# Patient Record
Sex: MALE | Race: WHITE | NOT HISPANIC OR LATINO | Employment: UNEMPLOYED | URBAN - METROPOLITAN AREA
[De-identification: names, ages, dates, MRNs, and addresses within clinical notes are randomized per-mention and may not be internally consistent; named-entity substitution may affect disease eponyms.]

---

## 2017-09-28 ENCOUNTER — ALLSCRIPTS OFFICE VISIT (OUTPATIENT)
Dept: OTHER | Facility: OTHER | Age: 32
End: 2017-09-28

## 2017-09-28 DIAGNOSIS — D64.9 ANEMIA: ICD-10-CM

## 2017-09-28 DIAGNOSIS — M54.6 PAIN IN THORACIC SPINE: ICD-10-CM

## 2017-09-28 DIAGNOSIS — K21.9 GASTRO-ESOPHAGEAL REFLUX DISEASE WITHOUT ESOPHAGITIS: ICD-10-CM

## 2017-09-28 DIAGNOSIS — R06.83 SNORING: ICD-10-CM

## 2017-09-28 DIAGNOSIS — M54.50 LOW BACK PAIN: ICD-10-CM

## 2017-09-28 DIAGNOSIS — M54.2 CERVICALGIA: ICD-10-CM

## 2017-09-28 DIAGNOSIS — R40.0 SOMNOLENCE: ICD-10-CM

## 2017-11-10 ENCOUNTER — TRANSCRIBE ORDERS (OUTPATIENT)
Dept: ADMINISTRATIVE | Facility: HOSPITAL | Age: 32
End: 2017-11-10

## 2017-11-10 DIAGNOSIS — G47.30 SLEEP APNEA, UNSPECIFIED TYPE: Primary | ICD-10-CM

## 2017-12-11 ENCOUNTER — HOSPITAL ENCOUNTER (OUTPATIENT)
Dept: SLEEP CENTER | Facility: CLINIC | Age: 32
Discharge: HOME | End: 2017-12-11
Payer: COMMERCIAL

## 2017-12-11 DIAGNOSIS — G47.33 OBSTRUCTIVE SLEEP APNEA SYNDROME: ICD-10-CM

## 2017-12-11 DIAGNOSIS — G47.30 SLEEP APNEA, UNSPECIFIED TYPE: ICD-10-CM

## 2017-12-11 PROCEDURE — 95810 POLYSOM 6/> YRS 4/> PARAM: CPT

## 2017-12-27 ENCOUNTER — TRANSCRIBE ORDERS (OUTPATIENT)
Dept: SLEEP CENTER | Facility: CLINIC | Age: 32
End: 2017-12-27

## 2017-12-27 DIAGNOSIS — G47.33 OSA (OBSTRUCTIVE SLEEP APNEA): Primary | ICD-10-CM

## 2018-01-15 ENCOUNTER — HOSPITAL ENCOUNTER (OUTPATIENT)
Dept: SLEEP CENTER | Facility: CLINIC | Age: 33
Discharge: HOME | End: 2018-01-15
Payer: COMMERCIAL

## 2018-01-15 VITALS
RESPIRATION RATE: 16 BRPM | TEMPERATURE: 97.8 F | SYSTOLIC BLOOD PRESSURE: 108 MMHG | DIASTOLIC BLOOD PRESSURE: 78 MMHG | HEIGHT: 67 IN | BODY MASS INDEX: 37.2 KG/M2 | HEART RATE: 68 BPM | WEIGHT: 237.05 LBS

## 2018-01-15 DIAGNOSIS — G47.33 OSA (OBSTRUCTIVE SLEEP APNEA): ICD-10-CM

## 2018-01-15 PROCEDURE — 95811 POLYSOM 6/>YRS CPAP 4/> PARM: CPT

## 2018-01-22 ENCOUNTER — APPOINTMENT (OUTPATIENT)
Dept: LAB | Facility: MEDICAL CENTER | Age: 33
End: 2018-01-22
Payer: COMMERCIAL

## 2018-01-22 ENCOUNTER — HOSPITAL ENCOUNTER (OUTPATIENT)
Dept: SLEEP CENTER | Facility: CLINIC | Age: 33
Discharge: HOME | End: 2018-01-22
Payer: COMMERCIAL

## 2018-01-22 ENCOUNTER — TRANSCRIBE ORDERS (OUTPATIENT)
Dept: ADMINISTRATIVE | Facility: HOSPITAL | Age: 33
End: 2018-01-22

## 2018-01-22 DIAGNOSIS — G47.61 PERIODIC LIMB MOVEMENT DISORDER: ICD-10-CM

## 2018-01-22 DIAGNOSIS — G47.61 PERIODIC LIMB MOVEMENT DISORDER: Primary | ICD-10-CM

## 2018-01-22 DIAGNOSIS — G47.33 OSA (OBSTRUCTIVE SLEEP APNEA): ICD-10-CM

## 2018-01-22 DIAGNOSIS — G47.30 SLEEP APNEA, UNSPECIFIED TYPE: ICD-10-CM

## 2018-01-22 LAB
BASOPHILS # BLD AUTO: 0.05 THOUSANDS/ΜL (ref 0–0.1)
BASOPHILS NFR BLD AUTO: 1 % (ref 0–1)
EOSINOPHIL # BLD AUTO: 0.29 THOUSAND/ΜL (ref 0–0.61)
EOSINOPHIL NFR BLD AUTO: 4 % (ref 0–6)
ERYTHROCYTE [DISTWIDTH] IN BLOOD BY AUTOMATED COUNT: 13.3 % (ref 11.6–15.1)
FERRITIN SERPL-MCNC: 46 NG/ML (ref 8–388)
FOLATE SERPL-MCNC: 16.2 NG/ML (ref 3.1–17.5)
HCT VFR BLD AUTO: 43 % (ref 36.5–49.3)
HGB BLD-MCNC: 14.5 G/DL (ref 12–17)
IRON SATN MFR SERPL: 13 %
IRON SERPL-MCNC: 44 UG/DL (ref 65–175)
IRON SERPL-MCNC: 45 UG/DL (ref 65–175)
LYMPHOCYTES # BLD AUTO: 2.81 THOUSANDS/ΜL (ref 0.6–4.47)
LYMPHOCYTES NFR BLD AUTO: 34 % (ref 14–44)
MAGNESIUM SERPL-MCNC: 2.4 MG/DL (ref 1.6–2.6)
MCH RBC QN AUTO: 28.9 PG (ref 26.8–34.3)
MCHC RBC AUTO-ENTMCNC: 33.7 G/DL (ref 31.4–37.4)
MCV RBC AUTO: 86 FL (ref 82–98)
MONOCYTES # BLD AUTO: 0.56 THOUSAND/ΜL (ref 0.17–1.22)
MONOCYTES NFR BLD AUTO: 7 % (ref 4–12)
NEUTROPHILS # BLD AUTO: 4.66 THOUSANDS/ΜL (ref 1.85–7.62)
NEUTS SEG NFR BLD AUTO: 54 % (ref 43–75)
NRBC BLD AUTO-RTO: 0 /100 WBCS
PLATELET # BLD AUTO: 289 THOUSANDS/UL (ref 149–390)
PMV BLD AUTO: 10.5 FL (ref 8.9–12.7)
RBC # BLD AUTO: 5.01 MILLION/UL (ref 3.88–5.62)
TIBC SERPL-MCNC: 342 UG/DL (ref 250–450)
TIBC SERPL-MCNC: 348 UG/DL (ref 250–450)
VIT B12 SERPL-MCNC: 734 PG/ML (ref 100–900)
WBC # BLD AUTO: 8.38 THOUSAND/UL (ref 4.31–10.16)

## 2018-01-22 PROCEDURE — 83540 ASSAY OF IRON: CPT

## 2018-01-22 PROCEDURE — 83550 IRON BINDING TEST: CPT

## 2018-01-22 PROCEDURE — 83735 ASSAY OF MAGNESIUM: CPT

## 2018-01-22 PROCEDURE — 82607 VITAMIN B-12: CPT

## 2018-01-22 PROCEDURE — 82728 ASSAY OF FERRITIN: CPT

## 2018-01-22 PROCEDURE — 82746 ASSAY OF FOLIC ACID SERUM: CPT

## 2018-01-22 PROCEDURE — 85025 COMPLETE CBC W/AUTO DIFF WBC: CPT

## 2018-01-22 PROCEDURE — 36415 COLL VENOUS BLD VENIPUNCTURE: CPT

## 2018-01-22 NOTE — CONSULTS
Sleep Consultation   Alejandro Melo  33 y o  male MRN: [id number]      Reason for consultation: Snoring, excessive daytime sleepiness    Requesting physician: Kadie Vergara    Assessment/Plan  34 y/o M with PMHx hiatal hernia s/p repair, GERD, chronic lumbar and thoracic spine pain who comes in for management of COLLEEN, periodic limb movement and chronic lumbar pain  1   Severe COLLEEN (AHI - 86 4) - to start AutoPAP 11-20 using a large [employer] Eson ([facility])      -  Start trial of autoPAP 11 - 20 today, follow compliance data in 1- 2 months      -  Discussed in depth the results of the sleep study and treatment pitfalls prior to initiation  Answered all questions regarding treatment    2  Restless legs/Periodic limb movement (PLM index - 36 1,  PLM index w/ arousal - 0 3) -  This may be secondary to chronic iron deficiency anemia, chronic low back pain, and COLLEEN       -  Treat COLLEEN as above       -  Check CBC, Iron studies to ensure that is not a cause for PLMs       -  Refer to hematology as he was previously on iron treatments       -  May benefit from Neurontin to help with chronic pain as well  Just received lidocaine injections as outpatient  3   Chronic pain -  May be contributing to sleepiness whether related to PLMS or restless sleep due to discomfort  May need to optimize pain management  History of Present Illness   HPI:  Alejandro Melo  is a 33 y o  male with PMHx as below who comes in for evaluation of snoring and excessive daytime sleepiness  Prior to come be evaluated today he underwent a diagnostic sleep study and CPAP titration  He was found to have severe COLLEEN and requires CPAP of 13 to help  Patient notes symptoms of difficulty falling asleep, difficulty staying asleep, snoring, excessive daytime sleepiness with an [place] score of 15, awakenings with gasping, witnessed apneas, morning headaches, awakenings with dry mouth    He is very tired through the day is often dosing off unintentionally  He denies symptoms of cataplexy, sleep paralysis, hypnopompic or hypnagogic hallucinations     He also admits to symptoms of restless legs  He was previously on IV iron therapy for severe iron deficiency anemia  Sleep History:  He goes to bed at approximately 10 - 11 pm, will get to sleep in 2 min, will get out of bed at 3-4 am   He will get up 3-4 times at night to go to the bathroom or due to back pain  He does nap during he day  The naps are 1 hr in duration and are not refreshing  He drinks a lot of caffeine with 4-5 bottles of soda and 3-4 energy drinks to keep him awake  Review of systems:  12 point review of systems was completed and was otherwise negative except as listed in HPI  Historical Information   Past Medical History:   Diagnosis Date    GERD (gastroesophageal reflux disease)     Hiatal hernia      Past Surgical History:   Procedure Laterality Date    APPENDECTOMY      NISSEN FUNDOPLICATION N/A 4/21/7394    Procedure: AND NISSEN FUNDOPLICATION ;  Surgeon: Silvio Monk MD;  Location: [facility];  Service:     SD LAP, REPAIR PARAESOPHAGEAL HERNIA, INCL FUNDOPLASTY W/ MESH N/A 2/25/2016    Procedure: LAPAROSCOPIC REPAIR OF HIATAL HERNIA; Joelle Goodell;  Surgeon: Silvio Monk MD;  Location: [facility];  Service: Thoracic     No family history on file  Social History     Social History    Marital status: /Civil Union     Spouse name: N/A    Number of children: N/A    Years of education: N/A     Occupational History    Not on file       Social History Main Topics    Smoking status: Never Smoker    Smokeless tobacco: Not on file      Comment: E cigarette (water vapor)    Alcohol use No    Drug use: Unknown    Sexual activity: Not on file     Other Topics Concern    Not on file     Social History Narrative    No narrative on file       Meds/Allergies   Allergies   Allergen Reactions    Bactrim [Sulfamethoxazole-Trimethoprim] Other (See Comments)     High fever, shakes    Benadryl [Diphenhydramine Hcl (Sleep)] Hives       Home medications:  Prior to Admission medications    Medication Sig Start Date End Date Taking? Authorizing Provider   calcium carbonate (TUMS) 500 mg chewable tablet Chew 1 tablet daily as needed for indigestion or heartburn  Historical Provider, MD   pantoprazole (PROTONIX) 40 mg tablet Take 40 mg by mouth 2 (two) times a day  Historical Provider, MD     No family history on file  Vitals:   Weight - 245 Heigh - 5'6 BMI - 39 BP - 116/70,  HR - 70   Neck - 47 cm  ESS - 15    Physical Exam  General:  Awake alert and oriented x 3, conversant without conversational dyspnea, NAD, normal affect, obese  HEENT:  PERRL, Sclera noninjected, nonicteric OU, Nares patent,  no craniofacial abnormalities, Mucous membranes, moist, no oral lesions, normal dentition, Mallampati class 4  NECK: Trachea midline, no accessory muscle use, no stridor, no cervical or supraclavicular adenopathy, JVP not elevated  CARDIAC: Reg, single s1/S2, no m/r/g  PULM: CTA bilaterally no wheezing, rhonchi or rales  ABD: Normoactive bowel sounds, soft nontender, nondistended, no rebound, no rigidity, no guarding  EXT: No cyanosis, no clubbing, no edema, normal capillary refill  NEURO: no focal neurologic deficits, AAOx3, moving all extremities appropriately    Labs: I have personally reviewed pertinent lab results      Sleep studies:  Diagnostic: 12/11/17 AHI - 88 9  Titration: CPAP titrated successfully to 13 cc of H2O                                       DO [facility]

## 2018-03-28 ENCOUNTER — OFFICE VISIT (OUTPATIENT)
Dept: SLEEP CENTER | Facility: CLINIC | Age: 33
End: 2018-03-28
Payer: COMMERCIAL

## 2018-03-28 VITALS
DIASTOLIC BLOOD PRESSURE: 80 MMHG | BODY MASS INDEX: 39.34 KG/M2 | SYSTOLIC BLOOD PRESSURE: 110 MMHG | WEIGHT: 244.8 LBS | HEART RATE: 80 BPM | HEIGHT: 66 IN

## 2018-03-28 DIAGNOSIS — G47.61 PERIODIC LIMB MOVEMENT: ICD-10-CM

## 2018-03-28 DIAGNOSIS — F51.12 INSUFFICIENT SLEEP SYNDROME: ICD-10-CM

## 2018-03-28 DIAGNOSIS — G47.19 EXCESSIVE DAYTIME SLEEPINESS: Primary | ICD-10-CM

## 2018-03-28 DIAGNOSIS — G47.33 OSA (OBSTRUCTIVE SLEEP APNEA): ICD-10-CM

## 2018-03-28 PROCEDURE — 99215 OFFICE O/P EST HI 40 MIN: CPT | Performed by: INTERNAL MEDICINE

## 2018-03-28 RX ORDER — IBUPROFEN 400 MG/1
1 TABLET ORAL EVERY 8 HOURS PRN
COMMUNITY

## 2018-03-28 NOTE — PROGRESS NOTES
Review of Systems      Genitourinary frequent urination at night   Cardiology none   Gastrointestinal none   Neurology headaches   Constitutional weight change of 10 or more pounds in the past year gain of 40 pounds   Integumentary none   Psychiatry depression   Musculoskeletal muscle tenderness/aching and back pain   Pulmonary none   ENT nasal or sinus congestion and decreased hearing   Endocrine none   Hematological blood donor

## 2018-03-28 NOTE — LETTER
March 28, 2018     Grecia Rosario [address] [facility]  [address]  [address] [id number]    Patient: Blanco Angeles YOB: 1985   Date of Visit: 3/28/2018       Dear Dr Maria Sherman:    Thank you for referring Ariel Guerra to me for evaluation  Below are my notes for this consultation  If you have questions, please do not hesitate to call me  I look forward to following your patient along with you  Sincerely,        Charity Hill DO        CC: MD Charity Chavez DO  3/28/2018 10:37 AM  Sign at close encounter  Progress Note - Sleep Medicine  Byron Fernandez Ortley  33 y o  male MRN: [id number]       Impression & Plan:   32 y/o M with PMHx hiatal hernia s/p repair, GERD, chronic lumbar and thoracic spine pain who comes in for follow up of COLLEEN  1    Excessive daytime sleepiness - multifactorial   Secondary to inadequate use of CPAP, insufficient sleep and periodic limb movements and chronic pain  -  Management of each as below      2  Severe COLLEEN (AHI - 86 4) - on CPAP 13 using a large [employer] Eson ([facility])  Residual AHI 3 2  However compliance is still not ideal   He only uses it less than 4 hours a day and about 80% of the time      -  I explained that he needs to use his machine for at least 4 hours but ideally up to 8 hours a day every night       -  He states there are times he sleeps without the mask  He also mentioned times where the mask will come off  I discussed strategies to get mask on before bed and then to keep it on including making sure it is adequately fastened  3   Insufficient sleep -  He only gets at best 4- 6 hours of sleep due to the fact that his children will keep him up late at night and then he will get up early for work  I explained that he needs to increase his sleep time by 30 minute each day  I also explained the importance of good sleep hygiene as well      4   Restless legs/Periodic limb movement (PLM index - 36 1,  PLM index w/ arousal - 0 3) -  This may be secondary to chronic iron deficiency anemia, chronic low back pain, and COLLEEN       -    Optimize COLLEEN treatment        -  He has low ferritin  I have asked that he start Iron supplementation with 325mg BID       -    He has an appointment with hematology pending, was previously on IV iron        -    If iron supplementation does not help, will add Neurontin to help with chronic pain as well       5  Chronic pain -  May be contributing to sleepiness whether related to PLMS or restless sleep due to discomfort  May need to optimize pain management        ______________________________________________________________________    HPI:    Farhat Delgadokristi  presents today for follow-up of for COLLEEN  He states that he feels better with the CPAP  However, even despite using the CPAP he still feels that there are days his sleep quality is not significantly better  He has noted a big difference overall in sleep quality but still feels that he is more tired than not  He admits that there are times he forgets to wear his mask or it will come off at night  He does not have trouble with pressure or mask type however  He also states that he has reduced sleep opportunity and is unable to get more sleep beyond on weekends  He does not note restless leg symptoms but does note chronic pain is still there  We also discussed lower iron levels and he still has iron tablets at home that he is willing to start taking again  Review of Systems:  Review of Systems   Constitutional: Positive for fatigue  Negative for appetite change and unexpected weight change  HENT: Negative for congestion, facial swelling and sinus pressure  Eyes: Negative for pain, discharge and visual disturbance  Respiratory: Negative for chest tightness, shortness of breath and wheezing  Cardiovascular: Negative      Gastrointestinal: Negative for abdominal distention, nausea and vomiting  Endocrine: Negative  Genitourinary: Negative  Musculoskeletal: Negative  Skin: Negative  Allergic/Immunologic: Negative  Neurological: Negative  Hematological: Negative  Social history updates:  History   Smoking Status    Never Smoker   Smokeless Tobacco    Never Used     Comment: E cigarette (water vapor)     Social History     Social History    Marital status: /Civil Union     Spouse name: N/A    Number of children: N/A    Years of education: N/A     Occupational History    Not on file  Social History Main Topics    Smoking status: Never Smoker    Smokeless tobacco: Never Used      Comment: E cigarette (water vapor)    Alcohol use No    Drug use: No    Sexual activity: Not on file     Other Topics Concern    Not on file     Social History Narrative    Caffeine use           PhysicalExamination:  Vitals:   /80   Pulse 80   Ht 5' 6" (1 676 m)   Wt 111 kg (244 lb 12 8 oz)   BMI 39 51 kg/m²    Physical Exam  General: Obese, Awake alert and oriented x 3, conversant without conversational dyspnea, NAD, normal affect  HEENT:  PERRL, Sclera noninjected, nonicteric OU, Nares patent,  no craniofacial abnormalities, Mucous membranes, moist, no oral lesions, normal dentition, Mallampati class 4  NECK: Trachea midline, no accessory muscle use, no stridor, no cervical or supraclavicular adenopathy, JVP not elevated  CARDIAC: Reg, single s1/S2, no m/r/g  PULM: CTA bilaterally no wheezing, rhonchi or rales  ABD: Normoactive bowel sounds, soft nontender, nondistended, no rebound, no rigidity, no guarding  EXT: No cyanosis, no clubbing, no edema, normal capillary refill  NEURO: no focal neurologic deficits, AAOx3, moving all extremities appropriately    Diagnostic Data:  Labs:   I personally reviewed the most recent laboratory data pertinent to today's visit    Lab Results   Component Value Date    WBC 8 38 01/22/2018    HGB 14 5 01/22/2018    HCT 43 0 01/22/2018 MCV 86 01/22/2018     01/22/2018     Lab Results   Component Value Date    GLUCOSE 107 02/25/2016    CALCIUM 8 2 (L) 02/25/2016     02/25/2016    K 4 5 02/25/2016    CO2 23 02/25/2016     02/25/2016    BUN 18 02/25/2016    CREATININE 1 12 02/25/2016     No results found for: IGE  Lab Results   Component Value Date    ALT 45 07/31/2015    AST 16 07/31/2015    ALKPHOS 106 07/31/2015    BILITOT 0 18 (L) 07/31/2015     Lab Results   Component Value Date    IRON 45 (L) 01/22/2018    TIBC 342 01/22/2018    FERRITIN 46 01/22/2018     Lab Results   Component Value Date    [id number] 734 01/22/2018     Lab Results   Component Value Date    FOLATE 16 2 01/22/2018       Sleep studies:  Diagnostic: 12/11/17 AHI - 88 9  Titration: CPAP titrated successfully to 13 cc of H2O                                  Compliance Data:  Type of CPAP:  13                                   Percent usage: 80%                                   Average time used: 3 hrs 56 mins                                  Time in large leak: 2 secs                                   Residual AHI: 3 2                                         Claudeen Berry, DO

## 2018-03-28 NOTE — PROGRESS NOTES
Progress Note - Sleep Medicine  Katheryn Urias  33 y o  male MRN: [id number]       Impression & Plan:   34 y/o M with PMHx hiatal hernia s/p repair, GERD, chronic lumbar and thoracic spine pain who comes in for follow up of COLLEEN  1   Excessive daytime sleepiness - [place] 13 - multifactorial   Secondary to inadequate use of CPAP, insufficient sleep and periodic limb movements and chronic pain  -  Management of each as below        -  He did have early REM onset on his CPAP titration but I feel that was more likely due to resolution of sleep deprivation  He denies symptoms of cataplexy, sleep paralysis or hallucinations so I feel narcolepsy is highly unlikely and he just needs to optimize current treatments  2   Severe COLLEEN (AHI - 86 4) - on CPAP 13 using a large [employer] Eson ([facility])  Residual AHI 3 2  However compliance is still not ideal   He only uses it less than 4 hours a day and about 80% of the time      -  I explained that he needs to use his machine for at least 4 hours but ideally up to 8 hours a day every night       -  He states there are times he sleeps without the mask  He also mentioned times where the mask will come off  I discussed strategies to get mask on before bed and then to keep it on including making sure it is adequately fastened  3   Insufficient sleep -  He only gets at best 4- 6 hours of sleep due to the fact that his children will keep him up late at night and then he will get up early for work  I explained that he needs to increase his sleep time by 30 minute each day  I also explained the importance of good sleep hygiene as well  4   Restless legs/Periodic limb movement (PLM index - 36 1,  PLM index w/ arousal - 0 3) -  This may be secondary to chronic iron deficiency anemia, chronic low back pain, and COLLEEN       -  Optimize COLLEEN treatment        -  He has low ferritin    I have asked that he start Iron supplementation with 325mg BID -  He has an appointment with hematology pending, was previously on IV iron        -  If iron supplementation does not help, will add Neurontin to help with chronic pain as well       5  Chronic pain -  May be contributing to sleepiness whether related to PLMS or restless sleep due to discomfort  May need to optimize pain management        ______________________________________________________________________    HPI:    Andi Reis  presents today for follow-up of for COLLEEN  He states that he feels better with the CPAP  However, even despite using the CPAP he still feels that there are days his sleep quality is not significantly better  He has noted a big difference overall in sleep quality but still feels that he is more tired than not  He admits that there are times he forgets to wear his mask or it will come off at night  He does not have trouble with pressure or mask type however  He also states that he has reduced sleep opportunity and is unable to get more sleep beyond on weekends  He does not note restless leg symptoms but does note chronic pain is still there  We also discussed lower iron levels and he still has iron tablets at home that he is willing to start taking again  Review of Systems:  Review of Systems   Constitutional: Positive for fatigue  Negative for appetite change and unexpected weight change  HENT: Negative for congestion, facial swelling and sinus pressure  Eyes: Negative for pain, discharge and visual disturbance  Respiratory: Negative for chest tightness, shortness of breath and wheezing  Cardiovascular: Negative  Gastrointestinal: Negative for abdominal distention, nausea and vomiting  Endocrine: Negative  Genitourinary: Negative  Musculoskeletal: Negative  Skin: Negative  Allergic/Immunologic: Negative  Neurological: Negative  Hematological: Negative          Social history updates:  History   Smoking Status    Never Smoker Smokeless Tobacco    Never Used     Comment: E cigarette (water vapor)     Social History     Social History    Marital status: /Civil Union     Spouse name: N/A    Number of children: N/A    Years of education: N/A     Occupational History    Not on file  Social History Main Topics    Smoking status: Never Smoker    Smokeless tobacco: Never Used      Comment: E cigarette (water vapor)    Alcohol use No    Drug use: No    Sexual activity: Not on file     Other Topics Concern    Not on file     Social History Narrative    Caffeine use           PhysicalExamination:  Vitals:   /80   Pulse 80   Ht 5' 6" (1 676 m)   Wt 111 kg (244 lb 12 8 oz)   BMI 39 51 kg/m²   Physical Exam  General: Obese, Awake alert and oriented x 3, conversant without conversational dyspnea, NAD, normal affect  HEENT:  PERRL, Sclera noninjected, nonicteric OU, Nares patent,  no craniofacial abnormalities, Mucous membranes, moist, no oral lesions, normal dentition, Mallampati class 4  NECK: Trachea midline, no accessory muscle use, no stridor, no cervical or supraclavicular adenopathy, JVP not elevated  CARDIAC: Reg, single s1/S2, no m/r/g  PULM: CTA bilaterally no wheezing, rhonchi or rales  ABD: Normoactive bowel sounds, soft nontender, nondistended, no rebound, no rigidity, no guarding  EXT: No cyanosis, no clubbing, no edema, normal capillary refill  NEURO: no focal neurologic deficits, AAOx3, moving all extremities appropriately    Diagnostic Data:  Labs:   I personally reviewed the most recent laboratory data pertinent to today's visit    Lab Results   Component Value Date    WBC 8 38 01/22/2018    HGB 14 5 01/22/2018    HCT 43 0 01/22/2018    MCV 86 01/22/2018     01/22/2018     Lab Results   Component Value Date    GLUCOSE 107 02/25/2016    CALCIUM 8 2 (L) 02/25/2016     02/25/2016    K 4 5 02/25/2016    CO2 23 02/25/2016     02/25/2016    BUN 18 02/25/2016    CREATININE 1 12 02/25/2016 No results found for: IGE  Lab Results   Component Value Date    ALT 45 07/31/2015    AST 16 07/31/2015    ALKPHOS 106 07/31/2015    BILITOT 0 18 (L) 07/31/2015     Lab Results   Component Value Date    IRON 45 (L) 01/22/2018    TIBC 342 01/22/2018    FERRITIN 46 01/22/2018     Lab Results   Component Value Date    [id number] 734 01/22/2018     Lab Results   Component Value Date    FOLATE 16 2 01/22/2018       Sleep studies:  Diagnostic: 12/11/17 AHI - 88 9  Titration: CPAP titrated successfully to 13 cc of H2O                                  Compliance Data:  Type of CPAP:  13                                   Percent usage: 80%                                   Average time used: 3 hrs 56 mins                                  Time in large leak: 2 secs                                   Residual AHI: 3 2                                         Andrzej Arizmendi DO

## 2018-03-28 NOTE — LETTER
March 28, 2018     Richard Jones, [place] [address] [facility]  [address]  [address] [address]    Patient: Dasia Cabral YOB: 1985   Date of Visit: 3/28/2018       Dear Dr Deanna Parikh:    Thank you for referring Cesario Chavis to me for evaluation  Below are my notes for this consultation  If you have questions, please do not hesitate to call me  I look forward to following your patient along with you  Sincerely,        Jayashree Jhaveri DO        CC: No Recipients  Jayashree Jhaveri DO  3/28/2018 10:40 AM  Sign at close encounter  Progress Note - Sleep Medicine  Byron Day  33 y o  male MRN: [id number]       Impression & Plan:   34 y/o M with PMHx hiatal hernia s/p repair, GERD, chronic lumbar and thoracic spine pain who comes in for follow up of COLLEEN  1    Excessive daytime sleepiness - multifactorial   Secondary to inadequate use of CPAP, insufficient sleep and periodic limb movements and chronic pain  -  Management of each as below        -  He did have early REM onset on his CPAP titration but I feel that was more likely due to resolution of sleep deprivation  He denies symptoms of cataplexy, sleep paralysis or hallucinations so I feel narcolepsy is highly unlikely and he just needs to optimize current treatments  2   Severe COLLEEN (AHI - 86 4) - on CPAP 13 using a large [employer] (DME - [facility])  Residual AHI 3 2  However compliance is still not ideal   He only uses it less than 4 hours a day and about 80% of the time      -  I explained that he needs to use his machine for at least 4 hours but ideally up to 8 hours a day every night       -  He states there are times he sleeps without the mask  He also mentioned times where the mask will come off  I discussed strategies to get mask on before bed and then to keep it on including making sure it is adequately fastened      3   Insufficient sleep -  He only gets at best 4- 6 hours of sleep due to the fact that his children will keep him up late at night and then he will get up early for work  I explained that he needs to increase his sleep time by 30 minute each day  I also explained the importance of good sleep hygiene as well  4   Restless legs/Periodic limb movement (PLM index - 36 1,  PLM index w/ arousal - 0 3) -  This may be secondary to chronic iron deficiency anemia, chronic low back pain, and COLLEEN       -    Optimize COLLEEN treatment        -  He has low ferritin  I have asked that he start Iron supplementation with 325mg BID       -    He has an appointment with hematology pending, was previously on IV iron        -    If iron supplementation does not help, will add Neurontin to help with chronic pain as well       5  Chronic pain -  May be contributing to sleepiness whether related to PLMS or restless sleep due to discomfort  May need to optimize pain management        ______________________________________________________________________    HPI:    Ezio Maryam  presents today for follow-up of for COLLEEN  He states that he feels better with the CPAP  However, even despite using the CPAP he still feels that there are days his sleep quality is not significantly better  He has noted a big difference overall in sleep quality but still feels that he is more tired than not  He admits that there are times he forgets to wear his mask or it will come off at night  He does not have trouble with pressure or mask type however  He also states that he has reduced sleep opportunity and is unable to get more sleep beyond on weekends  He does not note restless leg symptoms but does note chronic pain is still there  We also discussed lower iron levels and he still has iron tablets at home that he is willing to start taking again  Review of Systems:  Review of Systems   Constitutional: Positive for fatigue  Negative for appetite change and unexpected weight change     HENT: Negative for congestion, facial swelling and sinus pressure  Eyes: Negative for pain, discharge and visual disturbance  Respiratory: Negative for chest tightness, shortness of breath and wheezing  Cardiovascular: Negative  Gastrointestinal: Negative for abdominal distention, nausea and vomiting  Endocrine: Negative  Genitourinary: Negative  Musculoskeletal: Negative  Skin: Negative  Allergic/Immunologic: Negative  Neurological: Negative  Hematological: Negative  Social history updates:  History   Smoking Status    Never Smoker   Smokeless Tobacco    Never Used     Comment: E cigarette (water vapor)     Social History     Social History    Marital status: /Civil Union     Spouse name: N/A    Number of children: N/A    Years of education: N/A     Occupational History    Not on file       Social History Main Topics    Smoking status: Never Smoker    Smokeless tobacco: Never Used      Comment: E cigarette (water vapor)    Alcohol use No    Drug use: No    Sexual activity: Not on file     Other Topics Concern    Not on file     Social History Narrative    Caffeine use           PhysicalExamination:  Vitals:   /80   Pulse 80   Ht 5' 6" (1 676 m)   Wt 111 kg (244 lb 12 8 oz)   BMI 39 51 kg/m²    Physical Exam  General: Obese, Awake alert and oriented x 3, conversant without conversational dyspnea, NAD, normal affect  HEENT:  PERRL, Sclera noninjected, nonicteric OU, Nares patent,  no craniofacial abnormalities, Mucous membranes, moist, no oral lesions, normal dentition, Mallampati class 4  NECK: Trachea midline, no accessory muscle use, no stridor, no cervical or supraclavicular adenopathy, JVP not elevated  CARDIAC: Reg, single s1/S2, no m/r/g  PULM: CTA bilaterally no wheezing, rhonchi or rales  ABD: Normoactive bowel sounds, soft nontender, nondistended, no rebound, no rigidity, no guarding  EXT: No cyanosis, no clubbing, no edema, normal capillary refill  NEURO: no focal neurologic deficits, AAOx3, moving all extremities appropriately    Diagnostic Data:  Labs:   I personally reviewed the most recent laboratory data pertinent to today's visit    Lab Results   Component Value Date    WBC 8 38 01/22/2018    HGB 14 5 01/22/2018    HCT 43 0 01/22/2018    MCV 86 01/22/2018     01/22/2018     Lab Results   Component Value Date    GLUCOSE 107 02/25/2016    CALCIUM 8 2 (L) 02/25/2016     02/25/2016    K 4 5 02/25/2016    CO2 23 02/25/2016     02/25/2016    BUN 18 02/25/2016    CREATININE 1 12 02/25/2016     No results found for: IGE  Lab Results   Component Value Date    ALT 45 07/31/2015    AST 16 07/31/2015    ALKPHOS 106 07/31/2015    BILITOT 0 18 (L) 07/31/2015     Lab Results   Component Value Date    IRON 45 (L) 01/22/2018    TIBC 342 01/22/2018    FERRITIN 46 01/22/2018     Lab Results   Component Value Date    CVYHTVLV13 734 01/22/2018     Lab Results   Component Value Date    FOLATE 16 2 01/22/2018       Sleep studies:  Diagnostic: 12/11/17 AHI - 88 9  Titration: CPAP titrated successfully to 13 cc of H2O                                  Compliance Data:  Type of CPAP:  13                                   Percent usage: 80%                                   Average time used: 3 hrs 56 mins                                  Time in large leak: 2 secs                                   Residual AHI: 3 2                                         Emerson Guillermo DO

## 2018-03-28 NOTE — PATIENT INSTRUCTIONS
Restless Legs Syndrome   WHAT YOU NEED TO KNOW:   Restless legs syndrome (RLS) is a condition that causes a powerful urge to move your legs and feet  You may also have tingling, creeping, itching, or throbbing sensations in your legs  You may have discomfort or pain  Movement relieves the symptoms for a short time  RLS is usually worse late in the day and at night  Your symptoms may come and go for days or weeks at a time, and worsen during periods of stress  DISCHARGE INSTRUCTIONS:   Contact your healthcare provider if:   · You cannot sleep because of your symptoms  · Your symptoms are getting worse  · You have questions or concerns about your condition or care  Medicines:   · Medicines  may help decrease your RLS symptoms  · Take your medicine as directed  Contact your healthcare provider if you think your medicine is not helping or if you have side effects  Tell him of her if you are allergic to any medicine  Keep a list of the medicines, vitamins, and herbs you take  Include the amounts, and when and why you take them  Bring the list or the pill bottles to follow-up visits  Carry your medicine list with you in case of an emergency  Manage your symptoms:   · Keep your legs warm  Wear thick socks or use an electric blanket  It may also help to take a hot bath or massage your legs before bedtime  · Exercise regularly  Moderate physical activity such as walking and stretching may help relieve your symptoms  Ask your healthcare provider about the best exercise plan for you  · Get enough sleep  You may need to go to bed earlier to get the sleep you need  Go to bed and wake up at the same time every day  · Do not drink caffeine or alcohol in the evening  Do not smoke or use tobacco products in the evening  Caffeine, alcohol, and tobacco can prevent you from sleeping well    Follow up with your healthcare provider as directed:  Write down your questions so you remember to ask them during your visits  © 2017 [employer]  Information is for End User's use only and may not be sold, redistributed or otherwise used for commercial purposes  All illustrations and images included in CareNotes® are the copyrighted property of A D [employer] M , [employer]  or [employer]  The above information is an  only  It is not intended as medical advice for individual conditions or treatments  Talk to your doctor, nurse or pharmacist before following any medical regimen to see if it is safe and effective for you  Sleep Apnea   AMBULATORY CARE:   Sleep apnea  is also called obstructive sleep apnea  It is a condition that causes you to stop breathing for 10 seconds or more while you are sleeping  During normal sleep, your throat is kept open by muscles that let air pass through easily  Sleep apnea causes the muscles and tissues around your throat to relax and block air from passing through  Sleep apnea may happen many times while you are asleep  Common symptoms include the following:   · No signs of breathing for 10 seconds or more while you sleep    · Snoring loudly, snorting, gasping or choking while you sleep, and waking up suddenly because of these    · A hard time thinking, remembering things, or focusing on your tasks the following day    · Headache or nausea    · Bedwetting or waking up often during the night to urinate    · Feeling sleepy, slow, and tired during the day  Seek care immediately if:   · You have chest pain or trouble breathing  Contact your healthcare provider if:   · You feel tired or depressed  · You have trouble staying awake during the day  · You have trouble thinking clearly  · You have questions or concerns about your condition or care  Treatment for sleep apnea  includes using a continuous positive airway pressure (CPAP) machine to keep your airway open during sleep  A mask is placed over your nose and mouth, or just your nose   The mask is hooked to the CPAP machine that blows a gentle stream of air into the mask when you breathe  This helps keep your airway open so you can breathe more regularly  Extra oxygen may be given to you through the machine  You may be given a mouth device  It looks like a mouth guard or dental retainer and stops your tongue and mouth tissues from blocking your throat while you sleep  Surgery may be needed to remove extra tissues that block your mouth, throat, or nose  Manage sleep apnea:   · Do not smoke  Nicotine and other chemicals in cigarettes and cigars can cause lung damage  Ask your healthcare provider for information if you currently smoke and need help to quit  E-cigarettes or smokeless tobacco still contain nicotine  Talk to your healthcare provider before you use these products  · Do not drink alcohol or take sedative medicine before you go to sleep  Alcohol and sedatives can relax the muscles and tissues around your throat  This can block the airflow to your lungs  · Maintain a healthy weight  Excess tissue around your throat may restrict your breathing  Ask your healthcare provider for information if you need to lose weight  · Sleep on your side or use pillows designed to prevent sleep apnea  This prevents your tongue or other tissues from blocking your throat  You can also raise the head of your bed  Follow up with your healthcare provider as directed:  Write down your questions so you remember to ask them during your visits  © 2017 [employer]  Information is for End User's use only and may not be sold, redistributed or otherwise used for commercial purposes  All illustrations and images included in CareNotes® are the copyrighted property of A [employer] A M [employer] Inc  or [employer]  The above information is an  only  It is not intended as medical advice for individual conditions or treatments   Talk to your doctor, nurse or pharmacist before following any medical regimen to see if it is safe and effective for you

## 2018-04-17 LAB
ACETAMINOPHEN LEVEL (HISTORICAL): < 10 MCQ/M
ALBUMIN SERPL BCP-MCNC: 4.6 G/DL (ref 3.5–5.7)
ALP SERPL-CCNC: 100 IU/L (ref 40–150)
ALT SERPL W P-5'-P-CCNC: 22 IU/L (ref 0–50)
ANION GAP SERPL CALCULATED.3IONS-SCNC: 13.7 MM/L
APTT PPP: 33.5 SEC (ref 24.4–37.6)
AST SERPL W P-5'-P-CCNC: 19 U/L (ref 8–27)
BASOPHILS # BLD AUTO: 0 X3/UL (ref 0–0.3)
BASOPHILS # BLD AUTO: 0.4 % (ref 0–2)
BILIRUB SERPL-MCNC: 0.4 MG/DL (ref 0.3–1)
BUN SERPL-MCNC: 18 MG/DL (ref 7–25)
CALCIUM SERPL-MCNC: 9.8 MG/DL (ref 8.6–10.5)
CHLORIDE SERPL-SCNC: 102 MM/L (ref 98–107)
CO2 SERPL-SCNC: 27 MM/L (ref 21–31)
CREAT SERPL-MCNC: 1.01 MG/DL (ref 0.7–1.3)
DEPRECATED RDW RBC AUTO: 13.2 %
EGFR (HISTORICAL): > 60 GFR
EGFR AFRICAN AMERICAN (HISTORICAL): > 60 GFR
EOSINOPHIL # BLD AUTO: 0 X3/UL (ref 0–0.5)
EOSINOPHIL NFR BLD AUTO: 0.3 % (ref 0–5)
ETHANOL (HISTORICAL): < 10 MG/DL
GLUCOSE (HISTORICAL): 99 MG/DL (ref 65–99)
HCT VFR BLD AUTO: 43.9 % (ref 42–52)
HGB BLD-MCNC: 15 G/DL (ref 14–18)
INR PPP: 1.14 (ref 0.9–1.5)
LYMPHOCYTES # BLD AUTO: 1.9 X3/UL (ref 1.2–4.2)
LYMPHOCYTES NFR BLD AUTO: 16.5 % (ref 20.5–51.1)
MCH RBC QN AUTO: 28.4 PG (ref 26–34)
MCHC RBC AUTO-ENTMCNC: 34.1 G/DL (ref 31–37)
MCV RBC AUTO: 83.4 FL (ref 81–99)
MEDICAL ALCOHOL (HISTORICAL): 0 %
MONOCYTES # BLD AUTO: 0.6 X3/UL (ref 0–1)
MONOCYTES NFR BLD AUTO: 5.2 % (ref 1.7–12)
NEUTROPHILS # BLD AUTO: 8.8 X3/UL (ref 1.4–6.5)
NEUTS SEG NFR BLD AUTO: 77.6 % (ref 42.2–75.2)
OSMOLALITY, SERUM (HISTORICAL): 279 MOSM (ref 262–291)
PLATELET # BLD AUTO: 302 X3/UL (ref 130–400)
PMV BLD AUTO: 8.1 FL
POTASSIUM SERPL-SCNC: 3.7 MM/L (ref 3.5–5.5)
PROTHROMBIN TIME (HISTORICAL): 13.1 SEC (ref 10.1–12.9)
RBC # BLD AUTO: 5.27 X6/UL (ref 4.3–5.9)
SALICYLATE LEVEL (HISTORICAL): < 5 MG/DL (ref 10–40)
SODIUM SERPL-SCNC: 139 MM/L (ref 134–143)
TOTAL PROTEIN (HISTORICAL): 7.5 G/DL (ref 6.4–8.9)
TSH SERPL DL<=0.05 MIU/L-ACNC: 2.18 UIU/M (ref 0.45–5.33)
WBC # BLD AUTO: 11.3 X3/UL (ref 4.8–10.8)

## 2018-04-18 LAB
ALBUMIN SERPL BCP-MCNC: 3.8 G/DL (ref 3.5–5.7)
ALP SERPL-CCNC: 85 IU/L (ref 40–150)
ALT SERPL W P-5'-P-CCNC: 17 IU/L (ref 0–50)
AMPHETAMINES (HISTORICAL): NEGATIVE
ANION GAP SERPL CALCULATED.3IONS-SCNC: 11.5 MM/L
AST SERPL W P-5'-P-CCNC: 15 U/L (ref 8–27)
BARBITURATES (HISTORICAL): NEGATIVE
BASOPHILS # BLD AUTO: 0 X3/UL (ref 0–0.3)
BASOPHILS # BLD AUTO: 0.6 % (ref 0–2)
BENZODIAZEPINES (HISTORICAL): NEGATIVE
BILIRUB SERPL-MCNC: 0.4 MG/DL (ref 0.3–1)
BUN SERPL-MCNC: 17 MG/DL (ref 7–25)
CALCIUM SERPL-MCNC: 9 MG/DL (ref 8.6–10.5)
CANNABINOIDS (HISTORICAL): POSITIVE
CHLORIDE SERPL-SCNC: 105 MM/L (ref 98–107)
CO2 SERPL-SCNC: 26 MM/L (ref 21–31)
COCAINE (HISTORICAL): NEGATIVE
CREAT SERPL-MCNC: 1.05 MG/DL (ref 0.7–1.3)
DEPRECATED RDW RBC AUTO: 13.6 %
EGFR (HISTORICAL): > 60 GFR
EGFR AFRICAN AMERICAN (HISTORICAL): > 60 GFR
EOSINOPHIL # BLD AUTO: 0.1 X3/UL (ref 0–0.5)
EOSINOPHIL NFR BLD AUTO: 1.2 % (ref 0–5)
GLUCOSE (HISTORICAL): 95 MG/DL (ref 65–99)
HCT VFR BLD AUTO: 38.9 % (ref 42–52)
HGB BLD-MCNC: 13.1 G/DL (ref 14–18)
INR PPP: 1.08 (ref 0.9–1.5)
LYMPHOCYTES # BLD AUTO: 2.6 X3/UL (ref 1.2–4.2)
LYMPHOCYTES NFR BLD AUTO: 33.5 % (ref 20.5–51.1)
MCH RBC QN AUTO: 28.5 PG (ref 26–34)
MCHC RBC AUTO-ENTMCNC: 33.8 G/DL (ref 31–37)
MCV RBC AUTO: 84.4 FL (ref 81–99)
METHADONE (HISTORICAL): NEGATIVE
MONOCYTES # BLD AUTO: 0.5 X3/UL (ref 0–1)
MONOCYTES NFR BLD AUTO: 7 % (ref 1.7–12)
NEUTROPHILS # BLD AUTO: 4.4 X3/UL (ref 1.4–6.5)
NEUTS SEG NFR BLD AUTO: 57.7 % (ref 42.2–75.2)
OPIATES (HISTORICAL): NEGATIVE
OSMOLALITY, SERUM (HISTORICAL): 279 MOSM (ref 262–291)
OXYCODONE (HISTORICAL): NEGATIVE
PHENCYCLIDINE URINE (HISTORICAL): NEGATIVE
PLATELET # BLD AUTO: 249 X3/UL (ref 130–400)
PLEASE NOTE (HISTORICAL): YES
PMV BLD AUTO: 8.7 FL
POTASSIUM SERPL-SCNC: 3.5 MM/L (ref 3.5–5.5)
PROPOXYPHENE (HISTORICAL): NEGATIVE
PROTHROMBIN TIME (HISTORICAL): 12.4 SEC (ref 10.1–12.9)
RBC # BLD AUTO: 4.6 X6/UL (ref 4.3–5.9)
SODIUM SERPL-SCNC: 139 MM/L (ref 134–143)
TOTAL PROTEIN (HISTORICAL): 6 G/DL (ref 6.4–8.9)
WBC # BLD AUTO: 7.6 X3/UL (ref 4.8–10.8)

## 2018-04-20 LAB
ACCESSION NUMBER (HISTORICAL): 108
COLLECTION DATE (HISTORICAL): NORMAL
CULTURE STATUS (HISTORICAL): NORMAL
MRSA SCREEN (HISTORICAL): NEGATIVE
SPECIMEN SOURCE (HISTORICAL): NORMAL
SPECIMEN TYPE RECEIVED: (HISTORICAL): NORMAL
TEST INFORMATION (HISTORICAL): NORMAL

## 2018-05-10 RX ORDER — BUPROPION HYDROCHLORIDE 100 MG/1
TABLET ORAL
COMMUNITY
Start: 2018-04-23

## 2018-05-16 LAB
ALBUMIN SERPL BCP-MCNC: 4.2 G/DL (ref 3.5–5.7)
ALP SERPL-CCNC: 101 IU/L (ref 40–150)
ALT SERPL W P-5'-P-CCNC: 18 IU/L (ref 0–50)
AMYLASE (HISTORICAL): 28 U/L (ref 29–103)
ANION GAP SERPL CALCULATED.3IONS-SCNC: 12 MM/L
APTT PPP: 25.6 SEC (ref 24.4–37.6)
AST SERPL W P-5'-P-CCNC: 13 U/L (ref 8–27)
BASOPHILS # BLD AUTO: 0 X3/UL (ref 0–0.3)
BASOPHILS # BLD AUTO: 0.6 % (ref 0–2)
BILIRUB SERPL-MCNC: 0.4 MG/DL (ref 0.3–1)
BILIRUB UR QL STRIP: NEGATIVE
BUN SERPL-MCNC: 14 MG/DL (ref 7–25)
CALCIUM SERPL-MCNC: 9.9 MG/DL (ref 8.6–10.5)
CHLORIDE SERPL-SCNC: 104 MM/L (ref 98–107)
CLARITY UR: CLEAR
CO2 SERPL-SCNC: 28 MM/L (ref 21–31)
COLOR UR: YELLOW
CREAT SERPL-MCNC: 0.98 MG/DL (ref 0.7–1.3)
DEPRECATED RDW RBC AUTO: 13.9 %
EGFR (HISTORICAL): > 60 GFR
EGFR AFRICAN AMERICAN (HISTORICAL): > 60 GFR
EOSINOPHIL # BLD AUTO: 0 X3/UL (ref 0–0.5)
EOSINOPHIL NFR BLD AUTO: 0.5 % (ref 0–5)
GLUCOSE (HISTORICAL): 118 MG/DL (ref 65–99)
GLUCOSE UR STRIP-MCNC: NEGATIVE MG/DL
HCT VFR BLD AUTO: 45.2 % (ref 42–52)
HGB BLD-MCNC: 15 G/DL (ref 14–18)
HGB UR QL STRIP.AUTO: ABNORMAL
INR PPP: 1.02 (ref 0.9–1.5)
KETONES UR STRIP-MCNC: NEGATIVE MG/DL
LEUKOCYTE ESTERASE UR QL STRIP: NEGATIVE
LIPASE SERPL-CCNC: 12 U/L (ref 11–82)
LYMPHOCYTES # BLD AUTO: 1.1 X3/UL (ref 1.2–4.2)
LYMPHOCYTES NFR BLD AUTO: 15.7 % (ref 20.5–51.1)
MCH RBC QN AUTO: 28.4 PG (ref 26–34)
MCHC RBC AUTO-ENTMCNC: 33.2 G/DL (ref 31–37)
MCV RBC AUTO: 85.5 FL (ref 81–99)
MONOCYTES # BLD AUTO: 0.3 X3/UL (ref 0–1)
MONOCYTES NFR BLD AUTO: 4.2 % (ref 1.7–12)
MUCUS THREADS (HISTORICAL): PRESENT
NEUTROPHILS # BLD AUTO: 5.4 X3/UL (ref 1.4–6.5)
NEUTS SEG NFR BLD AUTO: 79 % (ref 42.2–75.2)
NITRITE UR QL STRIP: NEGATIVE
NON-SQ EPI CELLS URNS QL MICRO: ABNORMAL /LPF
OSMOLALITY, SERUM (HISTORICAL): 281 MOSM (ref 262–291)
PH UR STRIP.AUTO: 6 [PH] (ref 4.5–8)
PLATELET # BLD AUTO: 238 X3/UL (ref 130–400)
PMV BLD AUTO: 8.5 FL
POTASSIUM SERPL-SCNC: 4 MM/L (ref 3.5–5.5)
PROT UR STRIP-MCNC: NEGATIVE MG/DL
PROTHROMBIN TIME (HISTORICAL): 11.7 SEC (ref 10.1–12.9)
RBC # BLD AUTO: 5.28 X6/UL (ref 4.3–5.9)
RBC #/AREA URNS AUTO: ABNORMAL /HPF
SODIUM SERPL-SCNC: 140 MM/L (ref 134–143)
SP GR UR STRIP.AUTO: 1.02 (ref 1–1.03)
TOTAL PROTEIN (HISTORICAL): 6.7 G/DL (ref 6.4–8.9)
UROBILINOGEN UR QL STRIP.AUTO: 0.2 EU/DL (ref 0.2–8)
WBC # BLD AUTO: 6.8 X3/UL (ref 4.8–10.8)
WBC #/AREA URNS AUTO: ABNORMAL /HPF

## 2018-05-23 LAB
ALBUMIN SERPL BCP-MCNC: 4.8 G/DL (ref 3.5–5.7)
ALP SERPL-CCNC: 110 IU/L (ref 40–150)
ALT SERPL W P-5'-P-CCNC: 23 IU/L (ref 0–50)
AMPHETAMINES (HISTORICAL): NEGATIVE
ANION GAP SERPL CALCULATED.3IONS-SCNC: 13.7 MM/L
AST SERPL W P-5'-P-CCNC: 19 U/L (ref 8–27)
BARBITURATES (HISTORICAL): NEGATIVE
BASOPHILS # BLD AUTO: 0.1 X3/UL (ref 0–0.3)
BASOPHILS # BLD AUTO: 0.8 % (ref 0–2)
BENZODIAZEPINES (HISTORICAL): NEGATIVE
BILIRUB SERPL-MCNC: 0.6 MG/DL (ref 0.3–1)
BILIRUB UR QL STRIP: NEGATIVE
BUN SERPL-MCNC: 10 MG/DL (ref 7–25)
CALCIUM SERPL-MCNC: 10 MG/DL (ref 8.6–10.5)
CANNABINOIDS (HISTORICAL): POSITIVE
CHLORIDE SERPL-SCNC: 103 MM/L (ref 98–107)
CLARITY UR: CLEAR
CO2 SERPL-SCNC: 25 MM/L (ref 21–31)
COCAINE (HISTORICAL): NEGATIVE
COLOR UR: YELLOW
CREAT SERPL-MCNC: 1 MG/DL (ref 0.7–1.3)
DEPRECATED RDW RBC AUTO: 14 %
EGFR (HISTORICAL): > 60 GFR
EGFR AFRICAN AMERICAN (HISTORICAL): > 60 GFR
EOSINOPHIL # BLD AUTO: 0 X3/UL (ref 0–0.5)
EOSINOPHIL NFR BLD AUTO: 0.3 % (ref 0–5)
ETHANOL (HISTORICAL): < 10 MG/DL
GLUCOSE (HISTORICAL): 105 MG/DL (ref 65–99)
GLUCOSE UR STRIP-MCNC: NEGATIVE MG/DL
HCT VFR BLD AUTO: 47.1 % (ref 42–52)
HGB BLD-MCNC: 15.7 G/DL (ref 14–18)
HGB UR QL STRIP.AUTO: NEGATIVE
KETONES UR STRIP-MCNC: NEGATIVE MG/DL
LEUKOCYTE ESTERASE UR QL STRIP: NEGATIVE
LYMPHOCYTES # BLD AUTO: 1.1 X3/UL (ref 1.2–4.2)
LYMPHOCYTES NFR BLD AUTO: 16.6 % (ref 20.5–51.1)
MCH RBC QN AUTO: 28.2 PG (ref 26–34)
MCHC RBC AUTO-ENTMCNC: 33.2 G/DL (ref 31–37)
MCV RBC AUTO: 84.9 FL (ref 81–99)
MEDICAL ALCOHOL (HISTORICAL): 0 %
METHADONE (HISTORICAL): NEGATIVE
MONOCYTES # BLD AUTO: 0.4 X3/UL (ref 0–1)
MONOCYTES NFR BLD AUTO: 5.6 % (ref 1.7–12)
NEUTROPHILS # BLD AUTO: 5.2 X3/UL (ref 1.4–6.5)
NEUTS SEG NFR BLD AUTO: 76.7 % (ref 42.2–75.2)
NITRITE UR QL STRIP: NEGATIVE
OPIATES (HISTORICAL): NEGATIVE
OSMOLALITY, SERUM (HISTORICAL): 275 MOSM (ref 262–291)
OXYCODONE (HISTORICAL): NEGATIVE
PH UR STRIP.AUTO: 6 [PH] (ref 4.5–8)
PHENCYCLIDINE URINE (HISTORICAL): NEGATIVE
PLATELET # BLD AUTO: 302 X3/UL (ref 130–400)
PLEASE NOTE (HISTORICAL): NORMAL
PMV BLD AUTO: 8.1 FL
POTASSIUM SERPL-SCNC: 3.7 MM/L (ref 3.5–5.5)
PROPOXYPHENE (HISTORICAL): NEGATIVE
PROT UR STRIP-MCNC: NEGATIVE MG/DL
RBC # BLD AUTO: 5.55 X6/UL (ref 4.3–5.9)
SODIUM SERPL-SCNC: 138 MM/L (ref 134–143)
SP GR UR STRIP.AUTO: 1.02 (ref 1–1.03)
TOTAL PROTEIN (HISTORICAL): 7.5 G/DL (ref 6.4–8.9)
TSH SERPL DL<=0.05 MIU/L-ACNC: 1.36 UIU/M (ref 0.45–5.33)
UROBILINOGEN UR QL STRIP.AUTO: 0.2 EU/DL (ref 0.2–8)
WBC # BLD AUTO: 6.8 X3/UL (ref 4.8–10.8)

## 2021-08-23 ENCOUNTER — APPOINTMENT (OUTPATIENT)
Dept: RADIOLOGY | Facility: CLINIC | Age: 36
End: 2021-08-23
Payer: COMMERCIAL

## 2021-08-23 ENCOUNTER — OFFICE VISIT (OUTPATIENT)
Dept: URGENT CARE | Facility: CLINIC | Age: 36
End: 2021-08-23
Payer: COMMERCIAL

## 2021-08-23 VITALS
HEART RATE: 90 BPM | BODY MASS INDEX: 36.15 KG/M2 | TEMPERATURE: 97 F | OXYGEN SATURATION: 97 % | WEIGHT: 224 LBS | DIASTOLIC BLOOD PRESSURE: 68 MMHG | SYSTOLIC BLOOD PRESSURE: 129 MMHG | RESPIRATION RATE: 18 BRPM

## 2021-08-23 DIAGNOSIS — M54.6 ACUTE RIGHT-SIDED THORACIC BACK PAIN: Primary | ICD-10-CM

## 2021-08-23 DIAGNOSIS — M54.6 ACUTE RIGHT-SIDED THORACIC BACK PAIN: ICD-10-CM

## 2021-08-23 PROCEDURE — 99213 OFFICE O/P EST LOW 20 MIN: CPT | Performed by: PHYSICIAN ASSISTANT

## 2021-08-23 PROCEDURE — 72072 X-RAY EXAM THORAC SPINE 3VWS: CPT

## 2021-08-23 RX ORDER — METHOCARBAMOL 500 MG/1
500 TABLET, FILM COATED ORAL 4 TIMES DAILY
Qty: 40 TABLET | Refills: 0 | Status: SHIPPED | OUTPATIENT
Start: 2021-08-23

## 2021-08-23 RX ORDER — METHYLPREDNISOLONE 4 MG/1
TABLET ORAL
Qty: 21 TABLET | Refills: 0 | Status: SHIPPED | OUTPATIENT
Start: 2021-08-23

## 2021-08-23 NOTE — PROGRESS NOTES
[facility]        NAME: Michael Tipton  is a 29 y o  male  : 1985    MRN: [id number]  DATE: 2021  TIME: 3:40 PM    Assessment and Plan   Acute right-sided thoracic back pain [M54 6]  1  Acute right-sided thoracic back pain  methylPREDNISolone 4 MG tablet therapy pack    methocarbamol (ROBAXIN) 500 mg tablet    CANCELED: XR spine thoracic 2 vw         Patient Instructions   Patient Instructions     X-ray shows no slipping or loss of disc height  Was muscle relaxers for the muscle spasm and tightness number back on steroids for pain and inflammation  May benefit from physical therapy  Follow up with PCP in 3-5 days  Proceed to  ER if symptoms worsen  Chief Complaint     Chief Complaint   Patient presents with    Back Pain     upper back pain since saturday  occured while trying to catch his son who fell off of a trampoline  was having pain for several weeks prior to this  History of Present Illness         52-year-old male presents to clinic with right-sided thoracic back pain for the last several weeks  It got worse over last few days after he tried to catch his son from falling off   A trampoline  No pain down his arm or numbness or tingling  He has a problem with his doctor for mostly lower back  His use marijuana for pain relief  Review of Systems   Review of Systems   Constitutional: Negative  HENT: Negative  Respiratory: Negative  Cardiovascular: Negative  Gastrointestinal: Negative  Musculoskeletal: Positive for back pain           Current Medications       Current Outpatient Medications:     calcium carbonate (TUMS) 500 mg chewable tablet, Chew 1 tablet daily as needed for indigestion or heartburn , Disp: , Rfl:     ibuprofen (MOTRIN) 400 mg tablet, Take 1 tablet by mouth every 8 (eight) hours as needed, Disp: , Rfl:     buPROPion (WELLBUTRIN) 100 mg tablet, , Disp: , Rfl:     methocarbamol (ROBAXIN) 500 mg tablet, Take 1 tablet (500 mg total) by mouth 4 (four) times a day, Disp: 40 tablet, Rfl: 0    methylPREDNISolone 4 MG tablet therapy pack, Use as directed on package, Disp: 21 tablet, Rfl: 0    pantoprazole (PROTONIX) 40 mg tablet, Take 40 mg by mouth 2 (two) times a day   (Patient not taking: Reported on 8/23/2021), Disp: , Rfl:     Current Allergies     Allergies as of 08/23/2021 - Reviewed 08/23/2021   Allergen Reaction Noted    Bactrim [sulfamethoxazole-trimethoprim] Other (See Comments) 02/23/2016    Benadryl [diphenhydramine hcl (sleep)] Hives 02/23/2016            The following portions of the patient's history were reviewed and updated as appropriate: allergies, current medications, past family history, past medical history, past social history, past surgical history and problem list      Past Medical History:   Diagnosis Date    Anemia     Daytime somnolence     Depression     Gastric ulcer     GERD (gastroesophageal reflux disease)     Hiatal hernia     Hypertension     Iron deficiency     Lumbar back pain     Neck pain     COLLEEN (obstructive sleep apnea)     Snoring        Past Surgical History:   Procedure Laterality Date    APPENDECTOMY  2012    COLONOSCOPY      HERNIA REPAIR      NISSEN FUNDOPLICATION N/A 7/21/9717    Procedure: AND NISSEN FUNDOPLICATION ;  Surgeon: Judi Espinoza MD;  Location: [facility];  Service:     HI LAP, REPAIR PARAESOPHAGEAL HERNIA, INCL FUNDOPLASTY W/ MESH N/A 2/25/2016    Procedure: LAPAROSCOPIC REPAIR OF HIATAL HERNIA; Bina Gastroplasty;  Surgeon: Juid Espinoza MD;  Location: [facility];  Service: Thoracic    UPPER GASTROINTESTINAL ENDOSCOPY      (Theraputic)       Family History   Problem Relation Age of Onset    Hypertension Father     Colon cancer Father     Crohn's disease Father     Liver disease Father     Diabetes type II Paternal Grandmother     Colon cancer Other     Colon cancer Mother     Crohn's disease Mother     Liver disease Mother     Colon cancer Maternal Grandfather          Medications have been verified  Objective   /68   Pulse 90   Temp (!) 97 °F (36 1 °C)   Resp 18   Wt 102 kg (224 lb)   SpO2 97%   BMI 36 15 kg/m²        Physical Exam     Physical Exam  Constitutional:       General: He is not in acute distress  Appearance: He is well-developed  HENT:      Head: Normocephalic and atraumatic  Cardiovascular:      Rate and Rhythm: Normal rate  Pulmonary:      Effort: Pulmonary effort is normal    Musculoskeletal:      Comments: Mild tenderness on the thoracic spine and very tender along the right medial scapular border  He has painful shoulder flexion and cross-body adduction  Skin:     General: Skin is warm and dry  Neurological:      Mental Status: He is alert and oriented to person, place, and time

## 2022-10-23 ENCOUNTER — HOSPITAL ENCOUNTER (EMERGENCY)
Facility: HOSPITAL | Age: 37
Discharge: HOME | End: 2022-10-23
Attending: EMERGENCY MEDICINE
Payer: MEDICARE

## 2022-10-23 ENCOUNTER — APPOINTMENT (EMERGENCY)
Dept: RADIOLOGY | Facility: HOSPITAL | Age: 37
End: 2022-10-23
Payer: MEDICARE

## 2022-10-23 VITALS
OXYGEN SATURATION: 97 % | TEMPERATURE: 98.2 F | RESPIRATION RATE: 16 BRPM | BODY MASS INDEX: 36.16 KG/M2 | WEIGHT: 225 LBS | DIASTOLIC BLOOD PRESSURE: 72 MMHG | HEIGHT: 66 IN | HEART RATE: 76 BPM | SYSTOLIC BLOOD PRESSURE: 121 MMHG

## 2022-10-23 DIAGNOSIS — J02.0 STREP PHARYNGITIS: Primary | ICD-10-CM

## 2022-10-23 LAB
ALBUMIN SERPL BCP-MCNC: 4.1 G/DL (ref 3.5–5)
ALP SERPL-CCNC: 92 U/L (ref 46–116)
ALT SERPL W P-5'-P-CCNC: 33 U/L (ref 12–78)
ANION GAP SERPL CALCULATED.3IONS-SCNC: 7 MMOL/L (ref 4–13)
AST SERPL W P-5'-P-CCNC: 15 U/L (ref 5–45)
BASOPHILS # BLD AUTO: 0.08 THOUSANDS/ÂΜL (ref 0–0.1)
BASOPHILS NFR BLD AUTO: 1 % (ref 0–1)
BILIRUB SERPL-MCNC: 0.31 MG/DL (ref 0.2–1)
BUN SERPL-MCNC: 13 MG/DL (ref 5–25)
CALCIUM SERPL-MCNC: 9.7 MG/DL (ref 8.3–10.1)
CHLORIDE SERPL-SCNC: 103 MMOL/L (ref 96–108)
CO2 SERPL-SCNC: 29 MMOL/L (ref 21–32)
CREAT SERPL-MCNC: 1.04 MG/DL (ref 0.6–1.3)
EOSINOPHIL # BLD AUTO: 0.12 THOUSAND/ÂΜL (ref 0–0.61)
EOSINOPHIL NFR BLD AUTO: 1 % (ref 0–6)
ERYTHROCYTE [DISTWIDTH] IN BLOOD BY AUTOMATED COUNT: 12.6 % (ref 11.6–15.1)
FLUAV RNA RESP QL NAA+PROBE: NEGATIVE
FLUBV RNA RESP QL NAA+PROBE: NEGATIVE
GFR SERPL CREATININE-BSD FRML MDRD: 91 ML/MIN/1.73SQ M
GLUCOSE SERPL-MCNC: 99 MG/DL (ref 65–140)
HCT VFR BLD AUTO: 44.1 % (ref 36.5–49.3)
HGB BLD-MCNC: 14.4 G/DL (ref 12–17)
IMM GRANULOCYTES # BLD AUTO: 0.02 THOUSAND/UL (ref 0–0.2)
IMM GRANULOCYTES NFR BLD AUTO: 0 % (ref 0–2)
LYMPHOCYTES # BLD AUTO: 2.1 THOUSANDS/ÂΜL (ref 0.6–4.47)
LYMPHOCYTES NFR BLD AUTO: 17 % (ref 14–44)
MCH RBC QN AUTO: 28.6 PG (ref 26.8–34.3)
MCHC RBC AUTO-ENTMCNC: 32.7 G/DL (ref 31.4–37.4)
MCV RBC AUTO: 88 FL (ref 82–98)
MONOCYTES # BLD AUTO: 0.77 THOUSAND/ÂΜL (ref 0.17–1.22)
MONOCYTES NFR BLD AUTO: 6 % (ref 4–12)
NEUTROPHILS # BLD AUTO: 9 THOUSANDS/ÂΜL (ref 1.85–7.62)
NEUTS SEG NFR BLD AUTO: 75 % (ref 43–75)
NRBC BLD AUTO-RTO: 0 /100 WBCS
PLATELET # BLD AUTO: 289 THOUSANDS/UL (ref 149–390)
PMV BLD AUTO: 10.4 FL (ref 8.9–12.7)
POTASSIUM SERPL-SCNC: 3.6 MMOL/L (ref 3.5–5.3)
PROT SERPL-MCNC: 8.1 G/DL (ref 6.4–8.4)
RBC # BLD AUTO: 5.04 MILLION/UL (ref 3.88–5.62)
RSV RNA RESP QL NAA+PROBE: NEGATIVE
S PYO DNA THROAT QL NAA+PROBE: DETECTED
SARS-COV-2 RNA RESP QL NAA+PROBE: NEGATIVE
SODIUM SERPL-SCNC: 139 MMOL/L (ref 135–147)
WBC # BLD AUTO: 12.09 THOUSAND/UL (ref 4.31–10.16)

## 2022-10-23 PROCEDURE — 87651 STREP A DNA AMP PROBE: CPT | Performed by: PHYSICIAN ASSISTANT

## 2022-10-23 PROCEDURE — 85025 COMPLETE CBC W/AUTO DIFF WBC: CPT | Performed by: PHYSICIAN ASSISTANT

## 2022-10-23 PROCEDURE — 80053 COMPREHEN METABOLIC PANEL: CPT | Performed by: PHYSICIAN ASSISTANT

## 2022-10-23 PROCEDURE — 0241U HB NFCT DS VIR RESP RNA 4 TRGT: CPT | Performed by: PHYSICIAN ASSISTANT

## 2022-10-23 PROCEDURE — 71046 X-RAY EXAM CHEST 2 VIEWS: CPT

## 2022-10-23 PROCEDURE — 99284 EMERGENCY DEPT VISIT MOD MDM: CPT | Performed by: PHYSICIAN ASSISTANT

## 2022-10-23 PROCEDURE — 36415 COLL VENOUS BLD VENIPUNCTURE: CPT | Performed by: PHYSICIAN ASSISTANT

## 2022-10-23 RX ORDER — AMOXICILLIN 500 MG/1
500 CAPSULE ORAL 3 TIMES DAILY
Qty: 21 CAPSULE | Refills: 0 | Status: SHIPPED | OUTPATIENT
Start: 2022-10-23 | End: 2022-10-30

## 2022-10-23 RX ORDER — AMOXICILLIN 250 MG/1
500 CAPSULE ORAL ONCE
Status: COMPLETED | OUTPATIENT
Start: 2022-10-23 | End: 2022-10-23

## 2022-10-23 RX ADMIN — AMOXICILLIN 500 MG: 250 CAPSULE ORAL at 16:36

## 2022-10-23 NOTE — ED PROVIDER NOTES
History  Chief Complaint   Patient presents with   • Vomiting     Pt reports getting a drink from [employer] on Thursday and states that when he got closer to the bottom he felt stuff in his mouth and said he became sick instantly after  Vomiting non-stop and having flu like symptoms  Pt states he thinks it might be mold  Pt took two at home covid tests and tested negative  This a 69-year-old male patient presenting for evaluation nausea vomiting fever  He states this past Thursday he was at [facility] where he ordered a drink and notice something at the bottle with drink  He immediately stopped drinking this  Later that day he developed nausea vomiting and fever  He states he thinks it is multiple on the cup  He states he was going his symptoms and believes he is become sick from this drink  States he would like whatever is in the cup tested so he can “Phuong the shit” out of [employer]  He was vomiting on Thursday and Friday and since then has had no additional vomiting  Fever on Thursday but again resolved  He now only complains of sore throat and nasal congestion  Headache  Sinus pressure  Denies chest pain or shortness of breath  Denies abdominal pain  History provided by:  Patient   used: No    Vomiting  Severity:  Moderate  Duration:  2 days  Timing:  Intermittent  Quality:  Stomach contents (non-bloody and non-bilious)  Progression:  Resolved  Chronicity:  New  Recent urination:  Normal  Relieved by:  Nothing  Worsened by:  Nothing  Associated symptoms: no abdominal pain, no arthralgias, no chills, no cough, no fever and no sore throat        Prior to Admission Medications   Prescriptions Last Dose Informant Patient Reported? Taking? buPROPion (WELLBUTRIN) 100 mg tablet   Yes No   Patient not taking: Reported on 8/23/2021   calcium carbonate (TUMS) 500 mg chewable tablet   Yes No   Sig: Chew 1 tablet daily as needed for indigestion or heartburn     ibuprofen (MOTRIN) 400 mg tablet   Yes No   Sig: Take 1 tablet by mouth every 8 (eight) hours as needed   methocarbamol (ROBAXIN) 500 mg tablet   No No   Sig: Take 1 tablet (500 mg total) by mouth 4 (four) times a day   methylPREDNISolone 4 MG tablet therapy pack   No No   Sig: Use as directed on package   pantoprazole (PROTONIX) 40 mg tablet   Yes No   Sig: Take 40 mg by mouth 2 (two) times a day  Patient not taking: Reported on 8/23/2021      Facility-Administered Medications: None       Past Medical History:   Diagnosis Date   • Anemia    • Daytime somnolence    • Depression    • Gastric ulcer    • GERD (gastroesophageal reflux disease)    • Hiatal hernia    • Hypertension    • Iron deficiency    • Lumbar back pain    • Neck pain    • COLLEEN (obstructive sleep apnea)    • Snoring        Past Surgical History:   Procedure Laterality Date   • APPENDECTOMY  2012   • COLONOSCOPY     • HERNIA REPAIR     • NISSEN FUNDOPLICATION N/A 8/52/7446    Procedure: AND NISSEN FUNDOPLICATION ;  Surgeon: Ashly Foss MD;  [facility];  Service:    • PA LAP, REPAIR PARAESOPHAGEAL HERNIA, INCL FUNDOPLASTY W/ MESH N/A 2/25/2016    Procedure: LAPAROSCOPIC REPAIR OF HIATAL HERNIA; Bina Gastroplasty;  Surgeon: Ashly Foss MD;  [facility];  Service: Thoracic   • UPPER GASTROINTESTINAL ENDOSCOPY      (Theraputic)       Family History   Problem Relation Age of Onset   • Hypertension Father    • Colon cancer Father    • Crohn's disease Father    • Liver disease Father    • Diabetes type II Paternal Grandmother    • Colon cancer Other    • Colon cancer Mother    • Crohn's disease Mother    • Liver disease Mother    • Colon cancer Maternal Grandfather      I have reviewed and agree with the history as documented      E-Cigarette/Vaping     E-Cigarette/Vaping Substances     Social History     Tobacco Use   • Smoking status: Never Smoker   • Smokeless tobacco: Never Used   • Tobacco comment: E cigarette (water vapor)   Substance Use Topics   • Alcohol use: No   • Drug use: Yes     Types: Marijuana       Review of Systems   Constitutional: Negative for chills and fever  HENT: Positive for congestion, sinus pressure and sinus pain  Negative for ear pain and sore throat  Eyes: Negative for pain and visual disturbance  Respiratory: Negative for cough and shortness of breath  Cardiovascular: Negative for chest pain and palpitations  Gastrointestinal: Positive for nausea and vomiting  Negative for abdominal pain  Genitourinary: Negative for dysuria and hematuria  Musculoskeletal: Negative for arthralgias and back pain  Skin: Negative for color change and rash  Neurological: Negative for seizures and syncope  All other systems reviewed and are negative  Physical Exam  Physical Exam  Vitals and nursing note reviewed  Constitutional:       Appearance: He is well-developed  HENT:      Head: Normocephalic and atraumatic  Eyes:      Conjunctiva/sclera: Conjunctivae normal    Cardiovascular:      Rate and Rhythm: Normal rate and regular rhythm  Heart sounds: No murmur heard  Pulmonary:      Effort: Pulmonary effort is normal  No respiratory distress  Breath sounds: Normal breath sounds  Abdominal:      Palpations: Abdomen is soft  Tenderness: There is no abdominal tenderness  Musculoskeletal:      Cervical back: Neck supple  Skin:     General: Skin is warm and dry  Neurological:      Mental Status: He is alert  GCS: GCS eye subscore is 4  GCS verbal subscore is 5  GCS motor subscore is 6  Cranial Nerves: Cranial nerves are intact  Comments: GCS 15  AAOx3  Ambulating in department without difficulty  CN II-XII grossly intact  No focal neuro deficits             Vital Signs  ED Triage Vitals   Temperature Pulse Respirations Blood Pressure SpO2   10/23/22 1403 10/23/22 1402 10/23/22 1402 10/23/22 1402 10/23/22 1402   98 2 °F (36 8 °C) 78 18 153/95 100 %      Temp Source Heart Rate Source Patient Position - Orthostatic VS BP Location FiO2 (%)   10/23/22 1403 10/23/22 1402 10/23/22 1402 10/23/22 1402 --   Oral Monitor Sitting Left arm       Pain Score       --                  Vitals:    10/23/22 1402   BP: 153/95   Pulse: 78   Patient Position - Orthostatic VS: Sitting         Visual Acuity      ED Medications  Medications - No data to display    Diagnostic Studies  Results Reviewed     Procedure Component Value Units Date/Time    CBC and differential [[id number]]     Lab Status: No result Specimen: Blood     Comprehensive metabolic panel [[id number]]     Lab Status: No result Specimen: Blood     FLU/RSV/COVID - if FLU/RSV clinically relevant [[id number]]     Lab Status: No result Specimen: Nares from Nose     Strep A PCR [[id number]]     Lab Status: No result Specimen: Throat                  XR chest 2 views    (Results Pending)              Procedures  Procedures         ED Course                               SBIRT 22yo+    Flowsheet Row Most Recent Value   SBIRT (23 yo +)    In order to provide better care to our patients, we are screening all of our patients for alcohol and drug use  Would it be okay to ask you these screening questions? Yes Filed at: 10/23/2022 1405   Initial Alcohol Screen: US AUDIT-C     1  How often do you have a drink containing alcohol? 0 Filed at: 10/23/2022 1405   2  How many drinks containing alcohol do you have on a typical day you are drinking? 0 Filed at: 10/23/2022 1405   3a  Male UNDER 65: How often do you have five or more drinks on one occasion? 0 Filed at: 10/23/2022 1405   Audit-C Score 0 Filed at: 10/23/2022 1405   TESHA: How many times in the past year have you    Used an illegal drug or used a prescription medication for non-medical reasons?  Never Filed at: 10/23/2022 1405                    MDM  Number of Diagnoses or Management Options  Strep pharyngitis: new and requires workup  Diagnosis management comments: Differential diagnosis includes but is not limited to viral syndrome, enteritis, gastroenteritis, gastritis, sinusitis, strep, mono, flu, COVID, RSV, pneumonia  Plan:  X-ray chest   Labs  Dispo pending  Amount and/or Complexity of Data Reviewed  Clinical lab tests: ordered and reviewed  Tests in the radiology section of CPT®: ordered and reviewed  Independent visualization of images, tracings, or specimens: yes    Risk of Complications, Morbidity, and/or Mortality  Presenting problems: moderate  Management options: low  General comments: 32-year-old male with sore throat fever congestion  Labs unremarkable  Strep test came back positive  Could explain his fever and sore throat  Started on amoxicillin  Explained that we are unable to do testing on whatever it was that was in his soda that was found at [facility]  He can contact the local health department with the [facility] to have further testing done  We discussed return parameters  Patient understands and agrees with plan  Patient Progress  Patient progress: stable      Disposition  Final diagnoses:   None     ED Disposition     None      Follow-up Information    None         Patient's Medications   Discharge Prescriptions    No medications on file       No discharge procedures on file      PDMP Review     None          ED Provider  Electronically Signed by           Lisa Alcazar PA-C  10/23/22 6799

## 2023-02-13 DIAGNOSIS — R12 HEARTBURN: Primary | ICD-10-CM

## 2023-02-21 ENCOUNTER — HOSPITAL ENCOUNTER (OUTPATIENT)
Dept: RADIOLOGY | Facility: HOSPITAL | Age: 38
Discharge: HOME | End: 2023-02-21

## 2023-02-21 DIAGNOSIS — R12 HEARTBURN: ICD-10-CM

## 2023-03-07 ENCOUNTER — OFFICE VISIT (OUTPATIENT)
Dept: CARDIAC SURGERY | Facility: CLINIC | Age: 38
End: 2023-03-07

## 2023-03-07 VITALS
WEIGHT: 229.28 LBS | TEMPERATURE: 97.4 F | BODY MASS INDEX: 36.85 KG/M2 | HEIGHT: 66 IN | RESPIRATION RATE: 26 BRPM | SYSTOLIC BLOOD PRESSURE: 136 MMHG | HEART RATE: 82 BPM | OXYGEN SATURATION: 98 % | DIASTOLIC BLOOD PRESSURE: 80 MMHG

## 2023-03-07 DIAGNOSIS — R12 HEARTBURN: Primary | ICD-10-CM

## 2023-03-07 NOTE — PROGRESS NOTES
Thoracic Consult  Assessment/Plan:    Heartburn  We had a discussion with Byron regarding his symptoms and barium swallow  He has a small recurrence of his wrap, despite performing a Bina during the first procedure  We could repair this again and bring down his wrap and repair his crura  His other option would be to be seen by our bariatric group to discuss gastric bypass  He is interested in a consultation with bariatric surgery, which we will arrange  The patient will get back to us regarding his decision after meeting with the bariatric team         Diagnoses and all orders for this visit:    Heartburn  -     Ambulatory Referral to Bariatric Surgery; Future            Thoracic History    Diagnosis: 1  Hiatal hernia with Aguila's ulcers  Procedures: Laparoscopic hiatal hernia repair with Bina gastroplasty and Nissen fundoplication performed on 2/25/16  Pathology: hernia sac consistent with mild chronic inactive gastritis, with 5 benign lymph nodes and no evidence of intestinal metaplasia, dysplasia, or neoplasia  Diagnosis: Heartburn s/p diaphragmatic repair 2/25/16   Procedures/Surgeries:    Pathology:    Adjuvant Therapy:        Patient ID: J Luis Mack  is a 40 y o  male  ECOG 0     HPI     Mr Alecia Cuevas is a 39 yo gentleman with a history of COLLEEN, GERD, and depression who presents today with symptoms of fatigue, heartburn s/p laparoscopic hiatal hernia repair with Bina and Nissen in Februrry 2016  Barium swallow from 2/21/23 revealed location of the fundoplication in the lower chest with spontaneous reflux observed  Normal esophageal motility, without any ulceration or fold thickening  About one year ago, he has heartburn/ reflux into his mouth and nose  This does not occur on a regular basis, a few times a month  He has had a lot of fatigue recently, which he experienced when he had anemia associated with his previous aguila ulcers  He also experiences occasional early satiety   He does have some liquid and solid regurgitation  He denies nausea, vomiting, unexplained weight loss  He does give blood regularly, and his las Hb was 14 in October 2022  He has a cough with yellow sputum production and occasional shortness of breath when he's done eating a big meal  He does have a cold right now, without any fever or chills  He has chronic diarrhea         The following portions of the patient's history were reviewed and updated as appropriate: allergies, current medications, past family history, past medical history, past social history, past surgical history and problem list     Past Medical History:   Diagnosis Date   • Anemia    • Daytime somnolence    • Depression    • Gastric ulcer    • GERD (gastroesophageal reflux disease)    • Hiatal hernia    • Hypertension    • Iron deficiency    • Lumbar back pain    • Neck pain    • COLLEEN (obstructive sleep apnea)    • Snoring       Past Surgical History:   Procedure Laterality Date   • APPENDECTOMY  2012   • COLONOSCOPY     • HERNIA REPAIR     • NISSEN FUNDOPLICATION N/A 4/41/0281    Procedure: AND NISSEN FUNDOPLICATION ;  Surgeon: Janice Shaknar MD;  [facility];  Service:    • TX LAP, REPAIR PARAESOPHAGEAL HERNIA, INCL FUNDOPLASTY W/ MESH N/A 2/25/2016    Procedure: LAPAROSCOPIC REPAIR OF HIATAL HERNIA; Bina Gastroplasty;  Surgeon: Janice Shankar MD;  [facility];  Service: Thoracic   • UPPER GASTROINTESTINAL ENDOSCOPY      (Theraputic)      Family History   Problem Relation Age of Onset   • Hypertension Father    • Colon cancer Father    • Crohn's disease Father    • Liver disease Father    • Diabetes type II Paternal Grandmother    • Colon cancer Other    • Colon cancer Mother    • Crohn's disease Mother    • Liver disease Mother    • Colon cancer Maternal Grandfather       Social History     Socioeconomic History   • Marital status:      Spouse name: Not on file   • Number of children: Not on file   • Years of education: Not on file   • Highest education level: Not on file   Occupational History   • Not on file   Tobacco Use   • Smoking status: Never   • Smokeless tobacco: Never   • Tobacco comments:     E cigarette (water vapor)   Substance and Sexual Activity   • Alcohol use: No   • Drug use: Yes     Types: Marijuana   • Sexual activity: Not on file   Other Topics Concern   • Not on file   Social History Narrative    Caffeine use     Social Determinants of Health     Financial Resource Strain: Not on file   Food Insecurity: Not on file   Transportation Needs: Not on file   Physical Activity: Not on file   Stress: Not on file   Social Connections: Not on file   Intimate Partner Violence: Not on file   Housing Stability: Not on file      Allergies   Allergen Reactions   • Bactrim [Sulfamethoxazole-Trimethoprim] Other (See Comments)     High fever, shakes   • Benadryl [Diphenhydramine Hcl (Sleep)] Hives     Current Outpatient Medications on File Prior to Visit   Medication Sig Dispense Refill   • calcium carbonate (TUMS) 500 mg chewable tablet Chew 1 tablet daily as needed for indigestion or heartburn  • ibuprofen (MOTRIN) 400 mg tablet Take 1 tablet by mouth every 8 (eight) hours as needed     • buPROPion (WELLBUTRIN) 100 mg tablet  (Patient not taking: Reported on 3/7/2023)     • methocarbamol (ROBAXIN) 500 mg tablet Take 1 tablet (500 mg total) by mouth 4 (four) times a day (Patient not taking: Reported on 3/7/2023) 40 tablet 0   • methylPREDNISolone 4 MG tablet therapy pack Use as directed on package (Patient not taking: Reported on 3/7/2023) 21 tablet 0   • pantoprazole (PROTONIX) 40 mg tablet Take 40 mg by mouth 2 (two) times a day  (Patient not taking: Reported on 3/7/2023)       No current facility-administered medications on file prior to visit  Review of Systems   Constitutional: Negative for appetite change, fatigue, fever and unexpected weight change  HENT: Positive for congestion   Negative for postnasal drip, sore throat, trouble swallowing and voice change  Eyes: Negative for visual disturbance  Respiratory: Negative for cough, chest tightness, shortness of breath and wheezing  Cardiovascular: Negative for chest pain and leg swelling  Gastrointestinal: Positive for diarrhea  Negative for abdominal pain, constipation, nausea and vomiting  Musculoskeletal: Negative for arthralgias, back pain and gait problem  Skin: Negative for color change  Neurological: Negative for dizziness, syncope, light-headedness and headaches  Psychiatric/Behavioral: Negative for agitation and behavioral problems  The patient is not nervous/anxious  All other systems reviewed and are negative  Objective:   Physical Exam  Vitals reviewed  Constitutional:       General: He is not in acute distress  Appearance: Normal appearance  He is well-developed  He is not ill-appearing or diaphoretic  HENT:      Head: Normocephalic and atraumatic  Eyes:      General: No scleral icterus  Extraocular Movements: Extraocular movements intact  Neck:      Trachea: No tracheal deviation  Cardiovascular:      Rate and Rhythm: Normal rate and regular rhythm  Pulses: Normal pulses  Heart sounds: Normal heart sounds  No murmur heard  Pulmonary:      Effort: Pulmonary effort is normal  No respiratory distress  Breath sounds: Normal breath sounds  No wheezing  Abdominal:      General: Bowel sounds are normal  There is no distension  Palpations: Abdomen is soft  Musculoskeletal:         General: Normal range of motion  Cervical back: Normal range of motion and neck supple  Right lower leg: No edema  Left lower leg: No edema  Skin:     General: Skin is warm and dry  Neurological:      Mental Status: He is alert and oriented to person, place, and time  Cranial Nerves: No cranial nerve deficit     Psychiatric:         Mood and Affect: Mood normal          Behavior: Behavior normal          Thought Content: Thought content normal      /80   Pulse 82   Temp (!) 97 4 °F (36 3 °C)   Resp (!) 26   Ht 5' 6" (1 676 m)   Wt 104 kg (229 lb 4 5 oz)   SpO2 98%   BMI 37 01 kg/m²        FL barium swallow ROUTINE esophagus    Result Date: 2/21/2023  Narrative BARIUM SWALLOW-ESOPHAGRAM INDICATION:   R12: Heartburn  COMPARISON:  2/26/2016 IMAGES:  41 FLUOROSCOPY TIME:   1 1 MIN  TECHNIQUE: The patient was given effervescent granules and barium by mouth and images of the esophagus were obtained  FINDINGS: The esophagus is normal in caliber  Esophageal motility is normal and emptying of contrast from the esophagus is prompt  No mucosal lesion, ulceration or evidence of fold thickening is seen  Gastroesophageal reflux was observed spontaneously   The patient is status post fundoplication which is located in the lower chest      Impression 1  The fundoplication is located in the lower chest  2   Spontaneous gastroesophageal reflux was observed   Workstation performed: [id number]

## 2023-03-07 NOTE — ASSESSMENT & PLAN NOTE
We had a discussion with Byron regarding his symptoms and barium swallow  He has a small recurrence of his wrap, despite performing a Bina during the first procedure  We could repair this again and bring down his wrap and repair his crura  His other option would be to be seen by our bariatric group to discuss gastric bypass  He is interested in a consultation with bariatric surgery, which we will arrange   The patient will get back to us regarding his decision after meeting with the bariatric team

## 2023-03-13 DIAGNOSIS — Z98.890 HISTORY OF NISSEN FUNDOPLICATION: Primary | ICD-10-CM

## 2023-03-13 DIAGNOSIS — K21.9 GERD (GASTROESOPHAGEAL REFLUX DISEASE): ICD-10-CM

## 2023-03-23 ENCOUNTER — HOSPITAL ENCOUNTER (OUTPATIENT)
Dept: RADIOLOGY | Facility: HOSPITAL | Age: 38
Discharge: HOME | End: 2023-03-23

## 2023-03-23 DIAGNOSIS — Z98.890 HISTORY OF NISSEN FUNDOPLICATION: ICD-10-CM

## 2023-03-23 DIAGNOSIS — K21.9 GERD (GASTROESOPHAGEAL REFLUX DISEASE): ICD-10-CM

## 2023-03-24 ENCOUNTER — TELEPHONE (OUTPATIENT)
Dept: BARIATRICS | Facility: CLINIC | Age: 38
End: 2023-03-24

## 2023-03-24 NOTE — TELEPHONE ENCOUNTER
----- Message from RPO sent at 3/24/2023  9:31 AM EDT -----  Please call patient to follow up with Dr Stanislaw Alfred as schedule to discuss his options

## 2023-05-04 ENCOUNTER — OFFICE VISIT (OUTPATIENT)
Dept: BARIATRICS | Facility: CLINIC | Age: 38
End: 2023-05-04

## 2023-05-04 VITALS
HEIGHT: 66 IN | SYSTOLIC BLOOD PRESSURE: 118 MMHG | DIASTOLIC BLOOD PRESSURE: 80 MMHG | HEART RATE: 78 BPM | WEIGHT: 223 LBS | BODY MASS INDEX: 35.84 KG/M2 | RESPIRATION RATE: 16 BRPM

## 2023-05-04 DIAGNOSIS — R12 HEARTBURN: ICD-10-CM

## 2023-05-04 NOTE — LETTER
May 4, 2023     Evelin Lopez, [address] [facility]  [facility]  [address] [facility] [address]  [address] [place] [address]    Patient: Brooks Ocnonell  YOB: 1985   Date of Visit: 5/4/2023       Dear Clearance [place]:    Thank you for referring Daniella Harkins to me for evaluation  Below are my notes for this consultation  If you have questions, please do not hesitate to call me  I look forward to following your patient along with you  Sincerely,        Jacqueline Gaitan MD        CC: MD Jacqueline Quevedo MD  5/4/2023  9:06 AM  Sign when Signing Visit  H&P Exam - Bariatric Surgery   Byron Armijo  40 y o  male MRN: [id number]  Unit/Bed#:  Encounter: [id number]    Assessment/Plan     Assessment:  37/M presents with migrated nissen fundoplication s/p laparoscopic repair of hiatal hernia, denia gastroplasty and nissen fundoplication; we discussed redo laparoscopic repair of hiatal hernia with take down of nissen fundoplication and conversion to darlene-en-y gastric bypass  Patient is unsure at this time that he is able to make the necessary changes in his lifestyle to move forward with this  At this time he is interested in weight loss and more in favor of redo laparoscopic hiatal hernia repair with takedown and redo of fundoplication  At this time we will schedule him to see Dr Dharmesh Harrington (medical weight management)  Ongoing follow up to assess symptoms control as he loses weight  History of Present Illness     HPI:  Byron Avery  is a 40 y o  male who presents with herniated wrap s/p laparoscopic hiatal hernia repair, denia gastroplasty and nissen fundoplication in 1514  He was doing well until about 6 months ago when he woke up with acid coming out of his nose and mouth  He states he weighed ~260 pounds and has lost ~40 pounds since the stress in his life has improved after  his wife  He and his father take care of his three kids  He denies hematemesis or melena   He does smoke "marijuana  He states this helps his nausea  He does drink several caffeinated beverages daily  He has a history of severe sleep apnea  He was not able to tolerate CPAP      Review of Systems is negative except as above    Historical Information   Past Medical History:   Diagnosis Date    Anemia     Daytime somnolence     Depression     Gastric ulcer     GERD (gastroesophageal reflux disease)     Hiatal hernia     Hypertension     Iron deficiency     Lumbar back pain     Neck pain     COLLEEN (obstructive sleep apnea)     Snoring      Past Surgical History:   Procedure Laterality Date    APPENDECTOMY  2012    COLONOSCOPY      HERNIA REPAIR      NISSEN FUNDOPLICATION N/A 6/39/5761    Procedure: AND NISSEN FUNDOPLICATION ;  Surgeon: Barb Castillo MD;  Location:  MAIN OR;  Service:     TX LAPS RPR PARAESPHGL HRNA INCL FUNDPLSTY [facility] N/A 2/25/2016    Procedure: LAPAROSCOPIC REPAIR OF HIATAL HERNIA; Karina Paula;  Surgeon: Barb Castillo MD;  [facility];  Service: Thoracic    UPPER GASTROINTESTINAL ENDOSCOPY      (Theraputic)     Social History   Social History     Substance and Sexual Activity   Alcohol Use No     Social History     Substance and Sexual Activity   Drug Use Yes    Types: Marijuana     Social History     Tobacco Use   Smoking Status Never   Smokeless Tobacco Never   Tobacco Comments    E cigarette (water vapor)     Family History: non-contributory    Meds/Allergies    all medications and allergies reviewed  Allergies   Allergen Reactions    Bactrim [Sulfamethoxazole-Trimethoprim] Other (See Comments)     High fever, shakes    Benadryl [Diphenhydramine Hcl (Sleep)] Hives       Objective    First Vitals:   @VSFIRST2(5,8,6,7,9,11,14,10:FIRST)@    Current Vitals:   Blood Pressure: 118/80 (05/04/23 0822)  Pulse: 78 (05/04/23 0822)  Respirations: 16 (05/04/23 0822)  Height: 5' 6\" (167 6 cm) (05/04/23 2016)  Weight - Scale: 101 kg (223 lb) (05/04/23 " 5869)    [unfilled]    Physical Exam  Constitutional:       Appearance: Normal appearance  HENT:      Head: Atraumatic  Nose: No rhinorrhea  Eyes:      Extraocular Movements: Extraocular movements intact  Cardiovascular:      Rate and Rhythm: Normal rate  Pulmonary:      Effort: Pulmonary effort is normal  No respiratory distress  Abdominal:      General: Abdomen is flat  There is no distension  Musculoskeletal:         General: Normal range of motion  Cervical back: Normal range of motion  Skin:     General: Skin is warm and dry  Neurological:      General: No focal deficit present  Mental Status: He is alert and oriented to person, place, and time  Psychiatric:         Mood and Affect: Mood normal          Behavior: Behavior normal          Lab Results: I have personally reviewed pertinent lab results  Imaging: I have personally reviewed pertinent reports  EKG, Pathology, and Other Studies: I have personally reviewed pertinent reports  Counseling / Coordination of Care  Total time spent today 45 minutes  Greater than 50% of total time was spent with the patient, counseling and coordination of care

## 2023-05-04 NOTE — PROGRESS NOTES
H&P Exam - Bariatric Surgery   Byron Adams  40 y o  male MRN: [id number]  Unit/Bed#:  Encounter: [id number]    Assessment/Plan     Assessment:  37/M presents with migrated nissen fundoplication s/p laparoscopic repair of hiatal hernia, denia gastroplasty and nissen fundoplication; we discussed redo laparoscopic repair of hiatal hernia with take down of nissen fundoplication and conversion to darlene-en-y gastric bypass  Patient is unsure at this time that he is able to make the necessary changes in his lifestyle to move forward with this  At this time he is interested in weight loss and more in favor of redo laparoscopic hiatal hernia repair with takedown and redo of fundoplication  At this time we will schedule him to see Dr Josie Khan (medical weight management)  Ongoing follow up to assess symptoms control as he loses weight  History of Present Illness     HPI:  Byron Pickett  is a 40 y o  male who presents with herniated wrap s/p laparoscopic hiatal hernia repair, denia gastroplasty and nissen fundoplication in 5937  He was doing well until about 6 months ago when he woke up with acid coming out of his nose and mouth  He states he weighed ~260 pounds and has lost ~40 pounds since the stress in his life has improved after  his wife  He and his father take care of his three kids  He denies hematemesis or melena  He does smoke marijuana  He states this helps his nausea  He does drink several caffeinated beverages daily  He has a history of severe sleep apnea  He was not able to tolerate CPAP      Review of Systems is negative except as above    Historical Information   Past Medical History:   Diagnosis Date    Anemia     Daytime somnolence     Depression     Gastric ulcer     GERD (gastroesophageal reflux disease)     Hiatal hernia     Hypertension     Iron deficiency     Lumbar back pain     Neck pain     COLLEEN (obstructive sleep apnea)     Snoring      Past Surgical History:   Procedure "Laterality Date    APPENDECTOMY  2012    COLONOSCOPY      HERNIA REPAIR      NISSEN FUNDOPLICATION N/A 2/85/0721    Procedure: AND NISSEN FUNDOPLICATION ;  Surgeon: Ady Goodwin MD;  [facility];  Service:     TX LAPS RPR PARAESPHGL HRNA INCL FUNDPLSTY W/MESH N/A 2/25/2016    Procedure: LAPAROSCOPIC REPAIR OF HIATAL HERNIA; Yordan Bray;  Surgeon: Ady Goodwin MD;  [facility];  Service: Thoracic    UPPER GASTROINTESTINAL ENDOSCOPY      (Theraputic)     Social History   Social History     Substance and Sexual Activity   Alcohol Use No     Social History     Substance and Sexual Activity   Drug Use Yes    Types: Marijuana     Social History     Tobacco Use   Smoking Status Never   Smokeless Tobacco Never   Tobacco Comments    E cigarette (water vapor)     Family History: non-contributory    Meds/Allergies   all medications and allergies reviewed  Allergies   Allergen Reactions    Bactrim [Sulfamethoxazole-Trimethoprim] Other (See Comments)     High fever, shakes    Benadryl [Diphenhydramine Hcl (Sleep)] Hives       Objective   First Vitals:   @VSFIRST2(5,8,6,7,9,11,14,10:FIRST)@    Current Vitals:   Blood Pressure: 118/80 (05/04/23 0822)  Pulse: 78 (05/04/23 0822)  Respirations: 16 (05/04/23 0822)  Height: 5' 6\" (167 6 cm) (05/04/23 6768)  Weight - Scale: 101 kg (223 lb) (05/04/23 3898)    [unfilled]    Physical Exam  Constitutional:       Appearance: Normal appearance  HENT:      Head: Atraumatic  Nose: No rhinorrhea  Eyes:      Extraocular Movements: Extraocular movements intact  Cardiovascular:      Rate and Rhythm: Normal rate  Pulmonary:      Effort: Pulmonary effort is normal  No respiratory distress  Abdominal:      General: Abdomen is flat  There is no distension  Musculoskeletal:         General: Normal range of motion  Cervical back: Normal range of motion  Skin:     General: Skin is warm and dry     Neurological:      General: No focal " deficit present  Mental Status: He is alert and oriented to person, place, and time  Psychiatric:         Mood and Affect: Mood normal          Behavior: Behavior normal          Lab Results: I have personally reviewed pertinent lab results  Imaging: I have personally reviewed pertinent reports  EKG, Pathology, and Other Studies: I have personally reviewed pertinent reports  Counseling / Coordination of Care  Total time spent today 45 minutes  Greater than 50% of total time was spent with the patient, counseling and coordination of care

## 2023-11-25 ENCOUNTER — APPOINTMENT (EMERGENCY)
Dept: CT IMAGING | Facility: HOSPITAL | Age: 38
End: 2023-11-25
Payer: MEDICARE

## 2023-11-25 ENCOUNTER — HOSPITAL ENCOUNTER (EMERGENCY)
Facility: HOSPITAL | Age: 38
Discharge: HOME | End: 2023-11-25
Attending: EMERGENCY MEDICINE
Payer: MEDICARE

## 2023-11-25 VITALS
OXYGEN SATURATION: 98 % | DIASTOLIC BLOOD PRESSURE: 87 MMHG | SYSTOLIC BLOOD PRESSURE: 138 MMHG | TEMPERATURE: 98.2 F | HEART RATE: 77 BPM | RESPIRATION RATE: 18 BRPM

## 2023-11-25 DIAGNOSIS — N20.0 NEPHROLITHIASIS: ICD-10-CM

## 2023-11-25 DIAGNOSIS — N23 RENAL COLIC ON RIGHT SIDE: Primary | ICD-10-CM

## 2023-11-25 LAB
ALBUMIN SERPL BCP-MCNC: 4.3 G/DL (ref 3.5–5)
ALP SERPL-CCNC: 73 U/L (ref 34–104)
ALT SERPL W P-5'-P-CCNC: 18 U/L (ref 7–52)
ANION GAP SERPL CALCULATED.3IONS-SCNC: 7 MMOL/L
APTT PPP: 26 SECONDS (ref 23–37)
AST SERPL W P-5'-P-CCNC: 16 U/L (ref 13–39)
BASOPHILS # BLD AUTO: 0.05 THOUSANDS/ÂΜL (ref 0–0.1)
BASOPHILS NFR BLD AUTO: 1 % (ref 0–1)
BILIRUB SERPL-MCNC: 0.42 MG/DL (ref 0.2–1)
BUN SERPL-MCNC: 15 MG/DL (ref 5–25)
CALCIUM SERPL-MCNC: 9.6 MG/DL (ref 8.4–10.2)
CHLORIDE SERPL-SCNC: 105 MMOL/L (ref 96–108)
CO2 SERPL-SCNC: 26 MMOL/L (ref 21–32)
CREAT SERPL-MCNC: 1.06 MG/DL (ref 0.6–1.3)
EOSINOPHIL # BLD AUTO: 0.03 THOUSAND/ÂΜL (ref 0–0.61)
EOSINOPHIL NFR BLD AUTO: 0 % (ref 0–6)
ERYTHROCYTE [DISTWIDTH] IN BLOOD BY AUTOMATED COUNT: 13.1 % (ref 11.6–15.1)
GFR SERPL CREATININE-BSD FRML MDRD: 89 ML/MIN/1.73SQ M
GLUCOSE SERPL-MCNC: 121 MG/DL (ref 65–140)
HCT VFR BLD AUTO: 43.5 % (ref 36.5–49.3)
HGB BLD-MCNC: 14.1 G/DL (ref 12–17)
IMM GRANULOCYTES # BLD AUTO: 0.04 THOUSAND/UL (ref 0–0.2)
IMM GRANULOCYTES NFR BLD AUTO: 0 % (ref 0–2)
INR PPP: 1.02 (ref 0.84–1.19)
LIPASE SERPL-CCNC: 14 U/L (ref 11–82)
LYMPHOCYTES # BLD AUTO: 1.58 THOUSANDS/ÂΜL (ref 0.6–4.47)
LYMPHOCYTES NFR BLD AUTO: 16 % (ref 14–44)
MCH RBC QN AUTO: 28.4 PG (ref 26.8–34.3)
MCHC RBC AUTO-ENTMCNC: 32.4 G/DL (ref 31.4–37.4)
MCV RBC AUTO: 88 FL (ref 82–98)
MONOCYTES # BLD AUTO: 0.41 THOUSAND/ÂΜL (ref 0.17–1.22)
MONOCYTES NFR BLD AUTO: 4 % (ref 4–12)
NEUTROPHILS # BLD AUTO: 8.11 THOUSANDS/ÂΜL (ref 1.85–7.62)
NEUTS SEG NFR BLD AUTO: 79 % (ref 43–75)
NRBC BLD AUTO-RTO: 0 /100 WBCS
PLATELET # BLD AUTO: 289 THOUSANDS/UL (ref 149–390)
PMV BLD AUTO: 10.5 FL (ref 8.9–12.7)
POTASSIUM SERPL-SCNC: 4 MMOL/L (ref 3.5–5.3)
PROT SERPL-MCNC: 6.9 G/DL (ref 6.4–8.4)
PROTHROMBIN TIME: 14 SECONDS (ref 11.6–14.5)
RBC # BLD AUTO: 4.96 MILLION/UL (ref 3.88–5.62)
SODIUM SERPL-SCNC: 138 MMOL/L (ref 135–147)
WBC # BLD AUTO: 10.22 THOUSAND/UL (ref 4.31–10.16)

## 2023-11-25 PROCEDURE — 96375 TX/PRO/DX INJ NEW DRUG ADDON: CPT

## 2023-11-25 PROCEDURE — 80053 COMPREHEN METABOLIC PANEL: CPT | Performed by: EMERGENCY MEDICINE

## 2023-11-25 PROCEDURE — 74177 CT ABD & PELVIS W/CONTRAST: CPT

## 2023-11-25 PROCEDURE — 96376 TX/PRO/DX INJ SAME DRUG ADON: CPT

## 2023-11-25 PROCEDURE — G1004 CDSM NDSC: HCPCS

## 2023-11-25 PROCEDURE — 85610 PROTHROMBIN TIME: CPT | Performed by: EMERGENCY MEDICINE

## 2023-11-25 PROCEDURE — 83690 ASSAY OF LIPASE: CPT | Performed by: EMERGENCY MEDICINE

## 2023-11-25 PROCEDURE — 85025 COMPLETE CBC W/AUTO DIFF WBC: CPT | Performed by: EMERGENCY MEDICINE

## 2023-11-25 PROCEDURE — 96374 THER/PROPH/DIAG INJ IV PUSH: CPT

## 2023-11-25 PROCEDURE — 99284 EMERGENCY DEPT VISIT MOD MDM: CPT

## 2023-11-25 PROCEDURE — 99285 EMERGENCY DEPT VISIT HI MDM: CPT | Performed by: EMERGENCY MEDICINE

## 2023-11-25 PROCEDURE — 36415 COLL VENOUS BLD VENIPUNCTURE: CPT | Performed by: EMERGENCY MEDICINE

## 2023-11-25 PROCEDURE — 85730 THROMBOPLASTIN TIME PARTIAL: CPT | Performed by: EMERGENCY MEDICINE

## 2023-11-25 RX ORDER — ONDANSETRON 2 MG/ML
4 INJECTION INTRAMUSCULAR; INTRAVENOUS ONCE
Status: COMPLETED | OUTPATIENT
Start: 2023-11-25 | End: 2023-11-25

## 2023-11-25 RX ORDER — OXYCODONE HYDROCHLORIDE 5 MG/1
5 TABLET ORAL EVERY 4 HOURS PRN
Qty: 15 TABLET | Refills: 0 | Status: SHIPPED | OUTPATIENT
Start: 2023-11-25 | End: 2023-12-05

## 2023-11-25 RX ORDER — ONDANSETRON 4 MG/1
4 TABLET, ORALLY DISINTEGRATING ORAL EVERY 8 HOURS PRN
Qty: 10 TABLET | Refills: 0 | Status: SHIPPED | OUTPATIENT
Start: 2023-11-25

## 2023-11-25 RX ORDER — KETOROLAC TROMETHAMINE 30 MG/ML
15 INJECTION, SOLUTION INTRAMUSCULAR; INTRAVENOUS ONCE
Status: COMPLETED | OUTPATIENT
Start: 2023-11-25 | End: 2023-11-25

## 2023-11-25 RX ORDER — FENTANYL CITRATE 50 UG/ML
50 INJECTION, SOLUTION INTRAMUSCULAR; INTRAVENOUS ONCE
Status: COMPLETED | OUTPATIENT
Start: 2023-11-25 | End: 2023-11-25

## 2023-11-25 RX ORDER — NAPROXEN 500 MG/1
500 TABLET ORAL 2 TIMES DAILY PRN
Qty: 20 TABLET | Refills: 0 | Status: SHIPPED | OUTPATIENT
Start: 2023-11-25

## 2023-11-25 RX ORDER — TAMSULOSIN HYDROCHLORIDE 0.4 MG/1
0.4 CAPSULE ORAL
Qty: 7 CAPSULE | Refills: 0 | Status: SHIPPED | OUTPATIENT
Start: 2023-11-25

## 2023-11-25 RX ADMIN — IOHEXOL 80 ML: 350 INJECTION, SOLUTION INTRAVENOUS at 08:17

## 2023-11-25 RX ADMIN — ONDANSETRON 4 MG: 2 INJECTION INTRAMUSCULAR; INTRAVENOUS at 09:14

## 2023-11-25 RX ADMIN — FENTANYL CITRATE 50 MCG: 50 INJECTION INTRAMUSCULAR; INTRAVENOUS at 07:20

## 2023-11-25 RX ADMIN — KETOROLAC TROMETHAMINE 15 MG: 30 INJECTION, SOLUTION INTRAMUSCULAR; INTRAVENOUS at 09:15

## 2023-11-25 RX ADMIN — FENTANYL CITRATE 50 MCG: 50 INJECTION INTRAMUSCULAR; INTRAVENOUS at 09:15

## 2023-11-25 RX ADMIN — ONDANSETRON 4 MG: 2 INJECTION INTRAMUSCULAR; INTRAVENOUS at 07:19

## 2023-11-25 NOTE — Clinical Note
Ishan Klein was seen and treated in our emergency department on 11/25/2023. No restrictions            Diagnosis:     Byron  may return to work on return date. He may return on this date: 11/27/2023         If you have any questions or concerns, please don't hesitate to call.       [address] NATALIE Deleon, DO    ______________________________           _______________          _______________  Hospital Representative                              Date                                Time

## 2023-11-25 NOTE — ED PROVIDER NOTES
History  Chief Complaint   Patient presents with    Abdominal Pain     Pt reports +N/V no diarrhea and lower abdominal pain that started about 1am. Pt reports having hiatal hernia repair about 9 years ago. Unable to keep down any liquids             50-year-old male, right-sided abdominal pain, right upper quadrant and right lower quadrant associate with nausea and vomiting.,  No falls no injury, started yesterday while at work, no overuse type injury, eating well drinking well prior to this, no previous history of discomfort like this in the past.,  He has had a history of hiatal hernia repair, Nissen fundoplasty and appendectomy. Has never had a small bowel obstruction. No other family members at home with similar symptoms. No melena no hematochezia no dysuria          Prior to Admission Medications   Prescriptions Last Dose Informant Patient Reported? Taking? buPROPion (WELLBUTRIN) 100 mg tablet   Yes No   Patient not taking: Reported on 3/7/2023   calcium carbonate (TUMS) 500 mg chewable tablet   Yes No   Sig: Chew 1 tablet daily as needed for indigestion or heartburn. Patient not taking: Reported on 5/4/2023   ibuprofen (MOTRIN) 400 mg tablet   Yes No   Sig: Take 1 tablet by mouth every 8 (eight) hours as needed   methocarbamol (ROBAXIN) 500 mg tablet   No No   Sig: Take 1 tablet (500 mg total) by mouth 4 (four) times a day   Patient not taking: Reported on 3/7/2023   methylPREDNISolone 4 MG tablet therapy pack   No No   Sig: Use as directed on package   Patient not taking: Reported on 3/7/2023   pantoprazole (PROTONIX) 40 mg tablet   Yes No   Sig: Take 40 mg by mouth 2 (two) times a day.    Patient not taking: Reported on 3/7/2023      Facility-Administered Medications: None       Past Medical History:   Diagnosis Date    Anemia     Daytime somnolence     Depression     Gastric ulcer     GERD (gastroesophageal reflux disease)     Hiatal hernia     Hypertension     Iron deficiency     Lumbar back pain Neck pain     COLLEEN (obstructive sleep apnea)     Snoring        Past Surgical History:   Procedure Laterality Date    APPENDECTOMY  2012    COLONOSCOPY      HERNIA REPAIR      NISSEN FUNDOPLICATION N/A 1/40/6462    Procedure: AND NISSEN FUNDOPLICATION ;  Surgeon: Chilo Delong MD;  [facility];  Service:     CO LAPS RPR PARAESPHGL HRNA INCL FUNDPLSTY W/MESH N/A 2/25/2016    Procedure: LAPAROSCOPIC REPAIR OF HIATAL HERNIA; Reginald Peek;  Surgeon: Chilo Delong MD;  [facility];  Service: Thoracic    UPPER GASTROINTESTINAL ENDOSCOPY      (Theraputic)       Family History   Problem Relation Age of Onset    Hypertension Father     Colon cancer Father     Crohn's disease Father     Liver disease Father     Diabetes type II Paternal Grandmother     Colon cancer Other     Colon cancer Mother     Crohn's disease Mother     Liver disease Mother     Colon cancer Maternal Grandfather      I have reviewed and agree with the history as documented. E-Cigarette/Vaping     E-Cigarette/Vaping Substances     Social History     Tobacco Use    Smoking status: Never    Smokeless tobacco: Never    Tobacco comments:     E cigarette (water vapor)   Substance Use Topics    Alcohol use: No    Drug use: Yes     Types: Marijuana       Review of Systems   Constitutional:  Negative for activity change, chills, diaphoresis and fever. HENT:  Negative for congestion, sinus pressure and sore throat. Eyes:  Negative for pain and visual disturbance. Respiratory:  Negative for cough, chest tightness, shortness of breath, wheezing and stridor. Cardiovascular:  Negative for chest pain and palpitations. Gastrointestinal:  Negative for abdominal distention, abdominal pain, constipation, diarrhea, nausea and vomiting. Genitourinary:  Negative for dysuria and frequency. Musculoskeletal:  Negative for neck pain and neck stiffness. Skin:  Negative for rash.    Neurological:  Negative for dizziness, speech difficulty, light-headedness, numbness and headaches. Physical Exam  Physical Exam  Vitals reviewed. Constitutional:       General: He is not in acute distress. Appearance: He is well-developed. He is not diaphoretic. HENT:      Head: Normocephalic and atraumatic. Right Ear: External ear normal.      Left Ear: External ear normal.      Nose: Nose normal.   Eyes:      General:         Right eye: No discharge. Left eye: No discharge. Pupils: Pupils are equal, round, and reactive to light. Neck:      Trachea: No tracheal deviation. Cardiovascular:      Rate and Rhythm: Normal rate and regular rhythm. Heart sounds: Normal heart sounds. No murmur heard. Pulmonary:      Effort: Pulmonary effort is normal. No respiratory distress. Breath sounds: Normal breath sounds. No stridor. Abdominal:      General: There is no distension. Palpations: Abdomen is soft. Tenderness: There is abdominal tenderness in the right upper quadrant and right lower quadrant. There is no guarding or rebound. Musculoskeletal:         General: Normal range of motion. Cervical back: Normal range of motion and neck supple. Skin:     General: Skin is warm and dry. Coloration: Skin is not pale. Findings: No erythema. Neurological:      General: No focal deficit present. Mental Status: He is alert and oriented to person, place, and time.          Vital Signs  ED Triage Vitals   Temperature Pulse Respirations Blood Pressure SpO2   11/25/23 0630 11/25/23 0627 11/25/23 0627 11/25/23 0627 11/25/23 0627   98.2 °F (36.8 °C) 77 18 138/87 98 %      Temp Source Heart Rate Source Patient Position - Orthostatic VS BP Location FiO2 (%)   11/25/23 0630 11/25/23 0627 -- 11/25/23 0627 --   Oral Monitor  Right arm       Pain Score       --                  Vitals:    11/25/23 0627   BP: 138/87   Pulse: 77         Visual Acuity      ED Medications  Medications   ondansetron (ZOFRAN) injection 4 mg (4 mg Intravenous Given 11/25/23 0719)   fentanyl citrate (PF) 100 MCG/2ML 50 mcg (50 mcg Intravenous Given 11/25/23 0720)   iohexol (OMNIPAQUE) 350 MG/ML injection (MULTI-DOSE) 80 mL (80 mL Intravenous Given 11/25/23 0817)   ketorolac (TORADOL) injection 15 mg (15 mg Intravenous Given 11/25/23 0915)   ondansetron (ZOFRAN) injection 4 mg (4 mg Intravenous Given 11/25/23 0914)   fentanyl citrate (PF) 100 MCG/2ML 50 mcg (50 mcg Intravenous Given 11/25/23 0915)       Diagnostic Studies  Results Reviewed       Procedure Component Value Units Date/Time    Protime-INR [[id number]]  (Normal) Collected: 11/25/23 0720    Lab Status: Final result Specimen: Blood from Arm, Left Updated: 11/25/23 0812     Protime 14.0 seconds      INR 1.02    APTT [[id number]]  (Normal) Collected: 11/25/23 0720    Lab Status: Final result Specimen: Blood from Arm, Left Updated: 11/25/23 0812     PTT 26 seconds     Comprehensive metabolic panel [[id number]] Collected: 11/25/23 0720    Lab Status: Final result Specimen: Blood from Arm, Left Updated: 11/25/23 0805     Sodium 138 mmol/L      Potassium 4.0 mmol/L      Chloride 105 mmol/L      CO2 26 mmol/L      ANION GAP 7 mmol/L      BUN 15 mg/dL      Creatinine 1.06 mg/dL      Glucose 121 mg/dL      Calcium 9.6 mg/dL      AST 16 U/L      ALT 18 U/L      Alkaline Phosphatase 73 U/L      Total Protein 6.9 g/dL      Albumin 4.3 g/dL      Total Bilirubin 0.42 mg/dL      eGFR 89 ml/min/1.73sq m     Narrative:      [place] guidelines for Chronic Kidney Disease (CKD):     Stage 1 with normal or high GFR (GFR > 90 mL/min/1.73 square meters)    Stage 2 Mild CKD (GFR = 60-89 mL/min/1.73 square meters)    Stage 3A Moderate CKD (GFR = 45-59 mL/min/1.73 square meters)    Stage 3B Moderate CKD (GFR = 30-44 mL/min/1.73 square meters)    Stage 4 Severe CKD (GFR = 15-29 mL/min/1.73 square meters)    Stage 5 End Stage CKD (GFR <15 mL/min/1.73 square meters)  Note: GFR calculation is accurate only with a steady state creatinine    Lipase [[id number]]  (Normal) Collected: 11/25/23 0720    Lab Status: Final result Specimen: Blood from Arm, Left Updated: 11/25/23 0805     Lipase 14 u/L     CBC and differential [[id number]]  (Abnormal) Collected: 11/25/23 0720    Lab Status: Final result Specimen: Blood from Arm, Left Updated: 11/25/23 0742     WBC 10.22 Thousand/uL      RBC 4.96 Million/uL      Hemoglobin 14.1 g/dL      Hematocrit 43.5 %      MCV 88 fL      MCH 28.4 pg      MCHC 32.4 g/dL      RDW 13.1 %      MPV 10.5 fL      Platelets 094 Thousands/uL      nRBC 0 /100 WBCs      Neutrophils Relative 79 %      Immat GRANS % 0 %      Lymphocytes Relative 16 %      Monocytes Relative 4 %      Eosinophils Relative 0 %      Basophils Relative 1 %      Neutrophils Absolute 8.11 Thousands/µL      Immature Grans Absolute 0.04 Thousand/uL      Lymphocytes Absolute 1.58 Thousands/µL      Monocytes Absolute 0.41 Thousand/µL      Eosinophils Absolute 0.03 Thousand/µL      Basophils Absolute 0.05 Thousands/µL                    CT abdomen pelvis with contrast   Final Result by Raquel Austin MD (11/25 1929)      Mild right-sided hydroureteronephrosis secondary to a 4 mm calculus in the distal right ureter. Associated delayed right-sided nephrogram. Nonobstructing right renal calculi. Status post fundoplication surgery. Small hiatal hernia. No bowel obstruction or focal inflammatory process. Additional findings as above. Workstation performed: [id number]                    Procedures  Procedures         ED Course  ED Course as of 11/25/23 0941   Sat Nov 25, 2023   1883 Personally reviewed CT scan, right distal ureteral stone                               SBIRT 22yo+      Flowsheet Row Most Recent Value   Initial Alcohol Screen: US AUDIT-C     1. How often do you have a drink containing alcohol? 0 Filed at: 11/25/2023 0631   2.  How many drinks containing alcohol do you have on a typical day you are drinking? 0 Filed at: 11/25/2023 0631   3a. Male UNDER 65: How often do you have five or more drinks on one occasion? 0 Filed at: 11/25/2023 0631   3b. FEMALE Any Age, or MALE 65+: How often do you have 4 or more drinks on one occassion? 0 Filed at: 11/25/2023 0631   Audit-C Score 0 Filed at: 11/25/2023 7969   TESHA: How many times in the past year have you. .. Used an illegal drug or used a prescription medication for non-medical reasons? Never Filed at: 11/25/2023 0631                      Medical Decision Making        Initial ED assessment:   28-year-old male, right upper quadrant right lower quadrant abdominal pain associate with nausea vomiting    Initial DDx includes but is not limited to:   Cholecystitis, choledocholithiasis, mesenteric adenitis, stomatitis, colitis, nephrolithiasis, small bowel obstruction, partial small bowel obstruction, peptic ulcer disease    Initial ED plan:   Blood work, CT imaging, pain control, antiemetics, ultimate disposition pending ED workup        Final ED summary/disposition:   After evaluation and workup in the emergency department, found to have nephrolithiasis, pain adequately controlled, discharged with Zofran, naproxen, oxycodone, Flomax    Amount and/or Complexity of Data Reviewed  Labs: ordered. Radiology: ordered. Risk  Prescription drug management. Disposition  Final diagnoses:   Renal colic on right side   Nephrolithiasis     Time reflects when diagnosis was documented in both MDM as applicable and the Disposition within this note       Time User Action Codes Description Comment    11/25/2023  9:38 AM Elisa Agarwal Add [N94] Renal colic on right side     11/25/2023  9:39 AM Elisa Agarwal Add [N20.0] Nephrolithiasis           ED Disposition       ED Disposition   Discharge    Condition   Stable    Date/Time   Sat Nov 25, 2023 1349    [address] discharge to home/self care.                    Follow-up Information       Follow up With Specialties Details Why Contact Info Additional [address] [facility]rology Call in 2 days To arrange for the next available appointment [address] [facility] [id number] [address] [facility]), [address], [place], [place], [address] [place]            Patient's Medications   Discharge Prescriptions    NAPROXEN (NAPROSYN) 500 MG TABLET    Take 1 tablet (500 mg total) by mouth 2 (two) times a day as needed for mild pain       Start Date: 11/25/2023End Date: --       Order Dose: 500 mg       Quantity: 20 tablet    Refills: 0    ONDANSETRON (ZOFRAN-ODT) 4 MG DISINTEGRATING TABLET    Take 1 tablet (4 mg total) by mouth every 8 (eight) hours as needed for nausea or vomiting for up to 10 doses       Start Date: 11/25/2023End Date: --       Order Dose: 4 mg       Quantity: 10 tablet    Refills: 0    OXYCODONE (ROXICODONE) 5 IMMEDIATE RELEASE TABLET    Take 1 tablet (5 mg total) by mouth every 4 (four) hours as needed for moderate pain for up to 10 days Max Daily Amount: 30 mg       Start Date: 11/25/2023End Date: 12/5/2023       Order Dose: 5 mg       Quantity: 15 tablet    Refills: 0    TAMSULOSIN (FLOMAX) 0.4 MG    Take 1 capsule (0.4 mg total) by mouth daily with dinner       Start Date: 11/25/2023End Date: --       Order Dose: 0.4 mg       Quantity: 7 capsule    Refills: 0       No discharge procedures on file.     PDMP Review       None            ED Provider  Electronically Signed by             Nithya Mg DO  11/25/23 7621

## 2024-09-17 PROBLEM — K21.9 GASTROESOPHAGEAL REFLUX DISEASE WITHOUT ESOPHAGITIS: Status: ACTIVE | Noted: 2024-09-17

## 2024-09-17 PROBLEM — G47.33 OSA (OBSTRUCTIVE SLEEP APNEA): Status: ACTIVE | Noted: 2017-12-22

## 2025-03-28 ENCOUNTER — TELEPHONE (OUTPATIENT)
Age: 40
End: 2025-03-28

## 2025-03-28 NOTE — TELEPHONE ENCOUNTER
Patient has been added to the [facility] wait list without a referral.    Insurance: [email or web address]   Insurance Type:    Commercial []   Medicaid [x]   [facility]   Medicare []  Location Preference: [place]  Provider Preference: non e  Virtual: Yes [] No [x]  Were outside resources sent: Yes [x] No []

## 2025-07-31 ENCOUNTER — OFFICE VISIT (OUTPATIENT)
Dept: DENTISTRY | Facility: CLINIC | Age: 40
End: 2025-07-31

## 2025-07-31 DIAGNOSIS — S02.5XXA CLOSED BROKEN TOOTH DUE TO TRAUMA WITHOUT COMPLICATION, INITIAL ENCOUNTER: Primary | ICD-10-CM

## 2025-07-31 PROCEDURE — D2391 RESIN-BASED COMPOSITE - 1 SURFACE, POSTERIOR: HCPCS

## 2025-07-31 PROCEDURE — D0220 INTRAORAL - PERIAPICAL FIRST RADIOGRAPHIC IMAGE: HCPCS

## 2025-07-31 PROCEDURE — D0140 LIMITED ORAL EVALUATION - PROBLEM FOCUSED: HCPCS
